# Patient Record
Sex: MALE | Race: WHITE | NOT HISPANIC OR LATINO | Employment: FULL TIME | URBAN - METROPOLITAN AREA
[De-identification: names, ages, dates, MRNs, and addresses within clinical notes are randomized per-mention and may not be internally consistent; named-entity substitution may affect disease eponyms.]

---

## 2020-03-19 ENCOUNTER — LAB (OUTPATIENT)
Dept: LAB | Facility: HOSPITAL | Age: 50
End: 2020-03-19

## 2020-03-19 ENCOUNTER — TRANSCRIBE ORDERS (OUTPATIENT)
Dept: ADMINISTRATIVE | Facility: HOSPITAL | Age: 50
End: 2020-03-19

## 2020-03-19 ENCOUNTER — HOSPITAL ENCOUNTER (OUTPATIENT)
Dept: CARDIOLOGY | Facility: HOSPITAL | Age: 50
Discharge: HOME | End: 2020-03-19
Admitting: UROLOGY

## 2020-03-19 DIAGNOSIS — N28.89 RENAL MASS: ICD-10-CM

## 2020-03-19 DIAGNOSIS — Z01.818 PREOP EXAMINATION: Primary | ICD-10-CM

## 2020-03-19 DIAGNOSIS — Z01.818 PREOP EXAMINATION: ICD-10-CM

## 2020-03-19 LAB
ABO GROUP BLD: NORMAL
BLD GP AB SCN SERPL QL: NEGATIVE
RH BLD: NEGATIVE
T&S EXPIRATION DATE: NORMAL

## 2020-03-19 PROCEDURE — 93005 ELECTROCARDIOGRAM TRACING: CPT | Performed by: UROLOGY

## 2020-03-19 PROCEDURE — 86900 BLOOD TYPING SEROLOGIC ABO: CPT

## 2020-03-19 PROCEDURE — 86850 RBC ANTIBODY SCREEN: CPT | Performed by: UROLOGY

## 2020-03-19 PROCEDURE — 85025 COMPLETE CBC W/AUTO DIFF WBC: CPT

## 2020-03-19 PROCEDURE — 86900 BLOOD TYPING SEROLOGIC ABO: CPT | Performed by: UROLOGY

## 2020-03-19 PROCEDURE — 36415 COLL VENOUS BLD VENIPUNCTURE: CPT

## 2020-03-19 PROCEDURE — 80048 BASIC METABOLIC PNL TOTAL CA: CPT

## 2020-03-19 PROCEDURE — 86901 BLOOD TYPING SEROLOGIC RH(D): CPT

## 2020-03-19 PROCEDURE — 86901 BLOOD TYPING SEROLOGIC RH(D): CPT | Performed by: UROLOGY

## 2020-03-20 LAB
ANION GAP SERPL CALCULATED.3IONS-SCNC: 7.6 MMOL/L (ref 5–15)
BASOPHILS # BLD AUTO: 0.04 10*3/MM3 (ref 0–0.2)
BASOPHILS NFR BLD AUTO: 0.6 % (ref 0–1.5)
BUN BLD-MCNC: 13 MG/DL (ref 6–20)
BUN/CREAT SERPL: 14.9 (ref 7–25)
CALCIUM SPEC-SCNC: 9.6 MG/DL (ref 8.6–10.5)
CHLORIDE SERPL-SCNC: 99 MMOL/L (ref 98–107)
CO2 SERPL-SCNC: 29.4 MMOL/L (ref 22–29)
CREAT BLD-MCNC: 0.87 MG/DL (ref 0.76–1.27)
DEPRECATED RDW RBC AUTO: 40.3 FL (ref 37–54)
EOSINOPHIL # BLD AUTO: 0.22 10*3/MM3 (ref 0–0.4)
EOSINOPHIL NFR BLD AUTO: 3 % (ref 0.3–6.2)
ERYTHROCYTE [DISTWIDTH] IN BLOOD BY AUTOMATED COUNT: 13.1 % (ref 12.3–15.4)
GFR SERPL CREATININE-BSD FRML MDRD: 93 ML/MIN/1.73
GLUCOSE BLD-MCNC: 108 MG/DL (ref 65–99)
HCT VFR BLD AUTO: 43.3 % (ref 37.5–51)
HGB BLD-MCNC: 14.4 G/DL (ref 13–17.7)
IMM GRANULOCYTES # BLD AUTO: 0.03 10*3/MM3 (ref 0–0.05)
IMM GRANULOCYTES NFR BLD AUTO: 0.4 % (ref 0–0.5)
LYMPHOCYTES # BLD AUTO: 1.71 10*3/MM3 (ref 0.7–3.1)
LYMPHOCYTES NFR BLD AUTO: 23.7 % (ref 19.6–45.3)
MCH RBC QN AUTO: 28.4 PG (ref 26.6–33)
MCHC RBC AUTO-ENTMCNC: 33.3 G/DL (ref 31.5–35.7)
MCV RBC AUTO: 85.4 FL (ref 79–97)
MONOCYTES # BLD AUTO: 0.56 10*3/MM3 (ref 0.1–0.9)
MONOCYTES NFR BLD AUTO: 7.8 % (ref 5–12)
NEUTROPHILS # BLD AUTO: 4.66 10*3/MM3 (ref 1.7–7)
NEUTROPHILS NFR BLD AUTO: 64.5 % (ref 42.7–76)
NRBC BLD AUTO-RTO: 0.1 /100 WBC (ref 0–0.2)
PLATELET # BLD AUTO: 222 10*3/MM3 (ref 140–450)
PMV BLD AUTO: 10.4 FL (ref 6–12)
POTASSIUM BLD-SCNC: 4.2 MMOL/L (ref 3.5–5.2)
RBC # BLD AUTO: 5.07 10*6/MM3 (ref 4.14–5.8)
SODIUM BLD-SCNC: 136 MMOL/L (ref 136–145)
WBC NRBC COR # BLD: 7.22 10*3/MM3 (ref 3.4–10.8)

## 2020-03-23 ENCOUNTER — LAB REQUISITION (OUTPATIENT)
Dept: LAB | Facility: HOSPITAL | Age: 50
End: 2020-03-23

## 2020-03-23 DIAGNOSIS — N28.89 OTHER SPECIFIED DISORDERS OF KIDNEY AND URETER: ICD-10-CM

## 2020-03-23 PROCEDURE — 88307 TISSUE EXAM BY PATHOLOGIST: CPT | Performed by: UROLOGY

## 2020-03-23 PROCEDURE — 88342 IMHCHEM/IMCYTCHM 1ST ANTB: CPT | Performed by: UROLOGY

## 2020-03-24 ENCOUNTER — LAB REQUISITION (OUTPATIENT)
Dept: LAB | Facility: HOSPITAL | Age: 50
End: 2020-03-24

## 2020-03-24 DIAGNOSIS — N28.89 OTHER SPECIFIED DISORDERS OF KIDNEY AND URETER: ICD-10-CM

## 2020-03-24 DIAGNOSIS — K21.9 GASTRO-ESOPHAGEAL REFLUX DISEASE WITHOUT ESOPHAGITIS: ICD-10-CM

## 2020-03-24 DIAGNOSIS — J44.9 CHRONIC OBSTRUCTIVE PULMONARY DISEASE, UNSPECIFIED (HCC): ICD-10-CM

## 2020-03-24 DIAGNOSIS — I10 ESSENTIAL (PRIMARY) HYPERTENSION: ICD-10-CM

## 2020-03-24 LAB
ANION GAP SERPL CALCULATED.3IONS-SCNC: 11 MMOL/L (ref 5–15)
ANION GAP SERPL CALCULATED.3IONS-SCNC: 13 MMOL/L (ref 5–15)
BASOPHILS # BLD AUTO: 0 10*3/MM3 (ref 0–0.2)
BASOPHILS NFR BLD AUTO: 0.1 % (ref 0–1.5)
BUN BLD-MCNC: 15 MG/DL (ref 6–20)
BUN BLD-MCNC: 16 MG/DL (ref 6–20)
BUN/CREAT SERPL: 10.9 (ref 7–25)
BUN/CREAT SERPL: 11.9 (ref 7–25)
CALCIUM SPEC-SCNC: 8.8 MG/DL (ref 8.6–10.5)
CALCIUM SPEC-SCNC: 9.1 MG/DL (ref 8.6–10.5)
CHLORIDE SERPL-SCNC: 94 MMOL/L (ref 98–107)
CHLORIDE SERPL-SCNC: 98 MMOL/L (ref 98–107)
CO2 SERPL-SCNC: 25 MMOL/L (ref 22–29)
CO2 SERPL-SCNC: 27 MMOL/L (ref 22–29)
CREAT BLD-MCNC: 1.34 MG/DL (ref 0.76–1.27)
CREAT BLD-MCNC: 1.37 MG/DL (ref 0.76–1.27)
DEPRECATED RDW RBC AUTO: 42 FL (ref 37–54)
EOSINOPHIL # BLD AUTO: 0 10*3/MM3 (ref 0–0.4)
EOSINOPHIL NFR BLD AUTO: 0.1 % (ref 0.3–6.2)
ERYTHROCYTE [DISTWIDTH] IN BLOOD BY AUTOMATED COUNT: 13.8 % (ref 12.3–15.4)
GFR SERPL CREATININE-BSD FRML MDRD: 55 ML/MIN/1.73
GFR SERPL CREATININE-BSD FRML MDRD: 56 ML/MIN/1.73
GLUCOSE BLD-MCNC: 133 MG/DL (ref 65–99)
GLUCOSE BLD-MCNC: 168 MG/DL (ref 65–99)
HCT VFR BLD AUTO: 42.8 % (ref 37.5–51)
HGB BLD-MCNC: 14.1 G/DL (ref 13–17.7)
LYMPHOCYTES # BLD AUTO: 1.1 10*3/MM3 (ref 0.7–3.1)
LYMPHOCYTES NFR BLD AUTO: 9.8 % (ref 19.6–45.3)
MCH RBC QN AUTO: 28.1 PG (ref 26.6–33)
MCHC RBC AUTO-ENTMCNC: 32.9 G/DL (ref 31.5–35.7)
MCV RBC AUTO: 85.4 FL (ref 79–97)
MONOCYTES # BLD AUTO: 0.9 10*3/MM3 (ref 0.1–0.9)
MONOCYTES NFR BLD AUTO: 8.3 % (ref 5–12)
NEUTROPHILS # BLD AUTO: 9.3 10*3/MM3 (ref 1.7–7)
NEUTROPHILS NFR BLD AUTO: 81.7 % (ref 42.7–76)
NRBC BLD AUTO-RTO: 0 /100 WBC (ref 0–0.2)
PLATELET # BLD AUTO: 228 10*3/MM3 (ref 140–450)
PMV BLD AUTO: 8 FL (ref 6–12)
POTASSIUM BLD-SCNC: 4.8 MMOL/L (ref 3.5–5.2)
POTASSIUM BLD-SCNC: 5.5 MMOL/L (ref 3.5–5.2)
RBC # BLD AUTO: 5.01 10*6/MM3 (ref 4.14–5.8)
SODIUM BLD-SCNC: 134 MMOL/L (ref 136–145)
SODIUM BLD-SCNC: 134 MMOL/L (ref 136–145)
WBC NRBC COR # BLD: 11.3 10*3/MM3 (ref 3.4–10.8)

## 2020-03-24 PROCEDURE — 80048 BASIC METABOLIC PNL TOTAL CA: CPT

## 2020-03-24 PROCEDURE — 80048 BASIC METABOLIC PNL TOTAL CA: CPT | Performed by: UROLOGY

## 2020-03-24 PROCEDURE — 85025 COMPLETE CBC W/AUTO DIFF WBC: CPT | Performed by: UROLOGY

## 2020-03-26 LAB
LAB AP CASE REPORT: NORMAL
LAB AP SYNOPTIC CHECKLIST: NORMAL
PATH REPORT.FINAL DX SPEC: NORMAL
PATH REPORT.GROSS SPEC: NORMAL

## 2020-04-09 PROCEDURE — 93010 ELECTROCARDIOGRAM REPORT: CPT | Performed by: INTERNAL MEDICINE

## 2020-08-05 ENCOUNTER — TRANSCRIBE ORDERS (OUTPATIENT)
Dept: ADMINISTRATIVE | Facility: HOSPITAL | Age: 50
End: 2020-08-05

## 2020-08-05 DIAGNOSIS — R06.02 SHORTNESS OF BREATH: Primary | ICD-10-CM

## 2020-08-28 ENCOUNTER — APPOINTMENT (OUTPATIENT)
Dept: CARDIOLOGY | Facility: HOSPITAL | Age: 50
End: 2020-08-28

## 2024-09-03 ENCOUNTER — OFFICE VISIT (OUTPATIENT)
Dept: FAMILY MEDICINE CLINIC | Facility: CLINIC | Age: 54
End: 2024-09-03
Payer: COMMERCIAL

## 2024-09-03 VITALS
BODY MASS INDEX: 42.66 KG/M2 | OXYGEN SATURATION: 96 % | WEIGHT: 315 LBS | RESPIRATION RATE: 18 BRPM | HEART RATE: 92 BPM | DIASTOLIC BLOOD PRESSURE: 86 MMHG | SYSTOLIC BLOOD PRESSURE: 129 MMHG | HEIGHT: 72 IN | TEMPERATURE: 98.5 F

## 2024-09-03 DIAGNOSIS — I10 ESSENTIAL HYPERTENSION: ICD-10-CM

## 2024-09-03 DIAGNOSIS — Z13.0 SCREENING FOR ENDOCRINE, METABOLIC AND IMMUNITY DISORDER: ICD-10-CM

## 2024-09-03 DIAGNOSIS — Z76.89 ENCOUNTER TO ESTABLISH CARE: ICD-10-CM

## 2024-09-03 DIAGNOSIS — M54.50 CHRONIC MIDLINE LOW BACK PAIN, UNSPECIFIED WHETHER SCIATICA PRESENT: ICD-10-CM

## 2024-09-03 DIAGNOSIS — E11.65 TYPE 2 DIABETES MELLITUS WITH HYPERGLYCEMIA, WITHOUT LONG-TERM CURRENT USE OF INSULIN: Primary | ICD-10-CM

## 2024-09-03 DIAGNOSIS — Z13.228 SCREENING FOR ENDOCRINE, METABOLIC AND IMMUNITY DISORDER: ICD-10-CM

## 2024-09-03 DIAGNOSIS — G89.29 CHRONIC PAIN OF LEFT KNEE: ICD-10-CM

## 2024-09-03 DIAGNOSIS — Z13.220 SCREENING FOR LIPID DISORDERS: ICD-10-CM

## 2024-09-03 DIAGNOSIS — M25.562 CHRONIC PAIN OF LEFT KNEE: ICD-10-CM

## 2024-09-03 DIAGNOSIS — M25.561 CHRONIC PAIN OF RIGHT KNEE: ICD-10-CM

## 2024-09-03 DIAGNOSIS — G89.29 CHRONIC PAIN OF RIGHT KNEE: ICD-10-CM

## 2024-09-03 DIAGNOSIS — Z13.29 SCREENING FOR THYROID DISORDER: ICD-10-CM

## 2024-09-03 DIAGNOSIS — R00.0 TACHYCARDIA: ICD-10-CM

## 2024-09-03 DIAGNOSIS — Z13.29 SCREENING FOR ENDOCRINE, METABOLIC AND IMMUNITY DISORDER: ICD-10-CM

## 2024-09-03 DIAGNOSIS — G89.29 CHRONIC MIDLINE LOW BACK PAIN, UNSPECIFIED WHETHER SCIATICA PRESENT: ICD-10-CM

## 2024-09-03 PROBLEM — K76.0 STEATOSIS OF LIVER: Status: ACTIVE | Noted: 2020-02-10

## 2024-09-03 PROBLEM — R91.1 SOLITARY PULMONARY NODULE: Status: ACTIVE | Noted: 2020-02-10

## 2024-09-03 PROBLEM — N28.89 RENAL MASS: Status: ACTIVE | Noted: 2020-02-10

## 2024-09-03 PROBLEM — S20.219A CONTUSION OF CHEST: Status: ACTIVE | Noted: 2020-01-27

## 2024-09-03 PROCEDURE — 99205 OFFICE O/P NEW HI 60 MIN: CPT | Performed by: REGISTERED NURSE

## 2024-09-03 RX ORDER — DAPAGLIFLOZIN AND METFORMIN HYDROCHLORIDE 10; 1000 MG/1; MG/1
1 TABLET, FILM COATED, EXTENDED RELEASE ORAL DAILY
Qty: 90 TABLET | Refills: 1 | Status: SHIPPED | OUTPATIENT
Start: 2024-09-03

## 2024-09-03 RX ORDER — ONDANSETRON 8 MG/1
8 TABLET, FILM COATED ORAL EVERY 8 HOURS PRN
COMMUNITY

## 2024-09-03 RX ORDER — METOPROLOL TARTRATE 100 MG
100 TABLET ORAL 2 TIMES DAILY
COMMUNITY
End: 2024-09-03 | Stop reason: DRUGHIGH

## 2024-09-03 RX ORDER — LOSARTAN POTASSIUM 50 MG/1
50 TABLET ORAL DAILY
COMMUNITY

## 2024-09-03 RX ORDER — DAPAGLIFLOZIN AND METFORMIN HYDROCHLORIDE 10; 1000 MG/1; MG/1
1 TABLET, FILM COATED, EXTENDED RELEASE ORAL DAILY
COMMUNITY
End: 2024-09-03

## 2024-09-03 RX ORDER — NAPROXEN 500 MG/1
TABLET ORAL
COMMUNITY

## 2024-09-03 RX ORDER — METOPROLOL TARTRATE 50 MG
50 TABLET ORAL 2 TIMES DAILY
COMMUNITY
End: 2024-09-03 | Stop reason: DRUGHIGH

## 2024-09-03 RX ORDER — METOPROLOL TARTRATE 50 MG
50 TABLET ORAL 2 TIMES DAILY
Qty: 180 TABLET | Refills: 1 | Status: SHIPPED | OUTPATIENT
Start: 2024-09-03

## 2024-09-03 RX ORDER — ORAL SEMAGLUTIDE 7 MG/1
7 TABLET ORAL DAILY
COMMUNITY
End: 2024-09-03

## 2024-09-03 RX ORDER — ATORVASTATIN CALCIUM 20 MG/1
20 TABLET, FILM COATED ORAL DAILY
COMMUNITY

## 2024-09-03 NOTE — PROGRESS NOTES
Chief Complaint  Establish Care (Patient is here to establish care today)    Subjective    History of Present Illness {CC  Problem List  Visit  Diagnosis   Encounters  Notes  Medications  Labs  Result Review Imaging  Media :23}     Arvin Patel presents to [facility] for Establish Care (Patient is here to establish care today).      History of Present Illness  Patient is a 53 y.o. male who presents to the clinic today for establishment of care with concerns or chronic type 2 diabetes, chronic hypertension with tachycardia, and chronic pain in bilateral knees and lumbar greater than 1 year.  Patient denies any chest pain, shortness of breath, or any fevers.  Patient denies any known exposure to COVID, flu, or any other contagious illnesses.    In regards to type 2 diabetes, patient shares that he is currently not taking any medication due to running out more than 2 months ago.  He shares that he previously was prescribed xigduo for his diabetes as well as Rybelsus.  He shares that in the past he had taken metformin by itself but most recently was taken Xigduo and Rybelsus.  He shares that they helped some but were not significantly improving his glucose levels.  Patient shares that Rybelsus made him extremely nauseated and he would like to discuss other options.  We discussed potentially starting Mounjaro today.  Patient would like to move forward with this option.    In regards to hypertension with tachycardia, patient is currently prescribed metoprolol.  He has not taken this for more than 2 months.  His blood pressure today is 129/86 and pulse rate is 92.  Patient does share that at home his pulse rate occasionally goes above 100.  He is okay with restarting this medication but would like to start at the lower dose of 50 mg twice daily.  Med has been sent to pharmacy at patient's request.    In regards to chronic pain in bilateral knees and lower back, patient shares  that he has been experiencing bilateral knee pain and lower back pain more than a year.  He shares that he had a very significant hard life with heavy work and is progressively had worsening knees and back.  Patient does report historical knee injuries but does not know of any recent specific injury.  Patient does take ibuprofen to help with the pain and discomfort.  Patient does try to exercise at home as well as use hot and cold therapy.  Patient shares that these are not working well for him with no significant relief.    Patient shares that he was started on cholesterol medication but have perfect cholesterol.  He shares that his provider told him that this was to help prevent cardiac disease.  Patient would rather check his cholesterol today and stay off of the cholesterol reducing medication if possible.       Review of Systems   Constitutional: Negative.  Negative for activity change, chills, fatigue and fever.   HENT: Negative.  Negative for congestion, dental problem, ear pain, hearing loss, rhinorrhea, sinus pain, sore throat, tinnitus and trouble swallowing.    Eyes: Negative.  Negative for pain and visual disturbance.   Respiratory: Negative.  Negative for cough, chest tightness, shortness of breath and wheezing.    Cardiovascular: Negative.  Negative for chest pain, palpitations and leg swelling.   Gastrointestinal: Negative.  Negative for abdominal pain, diarrhea, nausea and vomiting.   Endocrine: Negative.  Negative for polydipsia, polyphagia and polyuria.   Genitourinary: Negative.  Negative for difficulty urinating, dysuria, frequency and urgency.   Musculoskeletal:  Positive for arthralgias and back pain. Negative for myalgias.   Skin: Negative.  Negative for color change, pallor, rash and wound.   Allergic/Immunologic: Negative.  Negative for environmental allergies.   Neurological: Negative.  Negative for dizziness, speech difficulty, weakness, light-headedness, numbness and headaches.  "  Hematological: Negative.    Psychiatric/Behavioral: Negative.  Negative for confusion, decreased concentration, self-injury and suicidal ideas. The patient is not nervous/anxious.    All other systems reviewed and are negative.       Objective     Vital Signs:   /86 (BP Location: Left arm, Patient Position: Sitting, Cuff Size: Large Adult)   Pulse 92   Temp 98.5 °F (36.9 °C) (Oral)   Resp 18   Ht 182.9 cm (72\")   Wt (!) 153 kg (338 lb 3.2 oz)   SpO2 96%   BMI 45.87 kg/m²   Current Outpatient Medications on File Prior to Visit   Medication Sig Dispense Refill    atorvastatin (LIPITOR) 20 MG tablet Take 1 tablet by mouth Daily.      naproxen (NAPROSYN) 500 MG tablet naproxen 500 mg tablet      ondansetron (ZOFRAN) 8 MG tablet Take 1 tablet by mouth Every 8 (Eight) Hours As Needed for Nausea or Vomiting.      [DISCONTINUED] dapagliflozin-metformin HCl ER (XIGDUO XR)  MG tablet Take 1 tablet by mouth Daily.      [DISCONTINUED] metoprolol tartrate (LOPRESSOR) 100 MG tablet Take 1 tablet by mouth 2 (Two) Times a Day.      [DISCONTINUED] Semaglutide (Rybelsus) 7 MG tablet Take 7 mg by mouth Daily.      losartan (COZAAR) 50 MG tablet Take 1 tablet by mouth Daily. (Patient not taking: Reported on 9/3/2024)      multivitamin with minerals (CENTRUM SILVER 50+MEN PO) Centrum Silver (Patient not taking: Reported on 9/3/2024)      [DISCONTINUED] metoprolol tartrate (LOPRESSOR) 50 MG tablet Take 1 tablet by mouth 2 (Two) Times a Day. (Patient not taking: Reported on 9/3/2024)       No current facility-administered medications on file prior to visit.        Past Medical History:   Diagnosis Date    Allergic     Anxiety     Arthritis     Cancer     Depression     Diabetes mellitus     GERD (gastroesophageal reflux disease)     HL (hearing loss)     Hypertension     Low back pain     Neuromuscular disorder     Obesity     Visual impairment       Past Surgical History:   Procedure Laterality Date    ADENOIDECTOMY "      ANKLE SURGERY Left     placement of bar and screws    NEPHRECTOMY Left     TONSILLECTOMY      WRIST GANGLION EXCISION Left       Family History   Problem Relation Age of Onset    Migraine headaches Mother     Hypertension Father     Hyperlipidemia Father     Diabetes Father     Stroke Father     Dementia Father     Cancer Sister       Social History     Socioeconomic History    Marital status:    Tobacco Use    Smoking status: Former     Current packs/day: 1.00     Average packs/day: 1 pack/day for 40.7 years (40.7 ttl pk-yrs)     Types: Cigarettes     Start date: 1984    Smokeless tobacco: Never   Vaping Use    Vaping status: Never Used   Substance and Sexual Activity    Alcohol use: Not Currently    Drug use: Never    Sexual activity: Yes     Partners: Female         No visits with results within 3 Month(s) from this visit.   Latest known visit with results is:   Lab Requisition on 03/24/2020   Component Date Value Ref Range Status    Glucose 03/24/2020 133 (H)  65 - 99 mg/dL Final    BUN 03/24/2020 16  6 - 20 mg/dL Final    Creatinine 03/24/2020 1.34 (H)  0.76 - 1.27 mg/dL Final    Sodium 03/24/2020 134 (L)  136 - 145 mmol/L Final    Potassium 03/24/2020 4.8  3.5 - 5.2 mmol/L Final    Chloride 03/24/2020 98  98 - 107 mmol/L Final    CO2 03/24/2020 25.0  22.0 - 29.0 mmol/L Final    Calcium 03/24/2020 9.1  8.6 - 10.5 mg/dL Final    eGFR Non African Amer 03/24/2020 56 (L)  >60 mL/min/1.73 Final    BUN/Creatinine Ratio 03/24/2020 11.9  7.0 - 25.0 Final    Anion Gap 03/24/2020 11.0  5.0 - 15.0 mmol/L Final         Physical Exam  Vitals and nursing note reviewed.   Constitutional:       Appearance: Normal appearance. He is obese.   HENT:      Head: Normocephalic and atraumatic.   Cardiovascular:      Rate and Rhythm: Normal rate and regular rhythm.      Pulses: Normal pulses.      Heart sounds: Normal heart sounds. No murmur heard.     No friction rub. No gallop.   Pulmonary:      Effort: Pulmonary effort  is normal. No respiratory distress.      Breath sounds: Normal breath sounds. No stridor. No wheezing, rhonchi or rales.   Chest:      Chest wall: No tenderness.   Abdominal:      General: Abdomen is flat. Bowel sounds are normal. There is no distension.      Palpations: Abdomen is soft. There is no mass.      Tenderness: There is no abdominal tenderness. There is no right CVA tenderness, left CVA tenderness, guarding or rebound.      Hernia: No hernia is present.   Skin:     General: Skin is warm and dry.      Capillary Refill: Capillary refill takes less than 2 seconds.      Coloration: Skin is not jaundiced or pale.   Neurological:      General: No focal deficit present.      Mental Status: He is alert and oriented to person, place, and time. Mental status is at baseline.      Motor: No weakness.      Coordination: Coordination normal.      Gait: Gait normal.   Psychiatric:         Mood and Affect: Mood normal.         Behavior: Behavior normal.         Thought Content: Thought content normal.         Judgment: Judgment normal.          Result Review  Data Reviewed:{ Labs  Result Review  Imaging  Med Tab  Media :23}   I have reviewed this patient's chart.  I have reviewed previous labs, previous imaging, previous medications, and previous encounters with notes that were available in this patient's chart.               Assessment and Plan {CC Problem List  Visit Diagnosis  ROS  Review (Popup)  [facility] Maintenance  Quality  BestPractice  Medications  SmartSets  SnapShot Encounters  Media :23}   Diagnoses and all orders for this visit:    1. Type 2 diabetes mellitus with hyperglycemia, without long-term current use of insulin (Primary)  -     Xigduo XR  MG tablet; Take 1 tablet by mouth Daily.  Dispense: 90 tablet; Refill: 1  -     Hemoglobin A1c; Future  -     Microalbumin / Creatinine Urine Ratio - Urine, Clean Catch; Future  -     Tirzepatide (Mounjaro) 2.5 MG/0.5ML solution pen-injector  pen; Inject 0.5 mL under the skin into the appropriate area as directed 1 (One) Time Per Week.  Dispense: 2 mL; Refill: 0    2. Essential hypertension  -     metoprolol tartrate (LOPRESSOR) 50 MG tablet; Take 1 tablet by mouth 2 (Two) Times a Day.  Dispense: 180 tablet; Refill: 1  -     CBC & Differential; Future  -     Comprehensive Metabolic Panel; Future  -     Tirzepatide (Mounjaro) 2.5 MG/0.5ML solution pen-injector pen; Inject 0.5 mL under the skin into the appropriate area as directed 1 (One) Time Per Week.  Dispense: 2 mL; Refill: 0    3. Tachycardia  -     metoprolol tartrate (LOPRESSOR) 50 MG tablet; Take 1 tablet by mouth 2 (Two) Times a Day.  Dispense: 180 tablet; Refill: 1  -     Tirzepatide (Mounjaro) 2.5 MG/0.5ML solution pen-injector pen; Inject 0.5 mL under the skin into the appropriate area as directed 1 (One) Time Per Week.  Dispense: 2 mL; Refill: 0    4. Screening for endocrine, metabolic and immunity disorder  -     CBC & Differential; Future    5. Screening for lipid disorders  -     Lipid Panel; Future    6. Screening for thyroid disorder  -     TSH; Future    7. Encounter to establish care    8. Chronic pain of left knee  -     XR Knee 1 or 2 View Bilateral; Future  -     MRI Knee Left Without Contrast; Future    9. Chronic pain of right knee  -     XR Knee 1 or 2 View Bilateral; Future  -     MRI Knee Right Without Contrast; Future    10. Chronic midline low back pain, unspecified whether sciatica present  -     XR Spine Lumbar 2 or 3 View; Future  -     MRI Lumbar Spine Without Contrast; Future        -Imaging of bilateral knees have been ordered to rule out cause of worsening pain.  -Imaging of lower lumbar has been ordered to rule out cause of worsening pain.  -Fasting labs ordered for tomorrow or when next available.  -Restart metoprolol at lower dose for blood pressure and pulse rate control.  -Start Mounjaro to help with diabetes control.  -ER red flags discussed with patient  including risk versus benefit and education provided.  -Follow-up with me in 3 months or sooner if needed.    I spent 60 minutes caring for Arvin on this date of service. This time includes time spent by me in the following activities:preparing for the visit, reviewing tests, obtaining and/or reviewing a separately obtained history, performing a medically appropriate examination and/or evaluation , counseling and educating the patient/family/caregiver, ordering medications, tests, or procedures, referring and communicating with other health care professionals , documenting information in the medical record, independently interpreting results and communicating that information with the patient/family/caregiver, and care coordination.    Follow Up {Instructions Charge Capture  Follow-up Communications :23}     Patient was given instructions and counseling regarding his condition or for health maintenance advice. Please see specific information pulled into the AVS (placed there by myself) if appropriate.    Return in about 3 months (around 12/3/2024) for routine follow up.            LEIGH Sifuentes, FNP-BC

## 2024-09-06 ENCOUNTER — CLINICAL SUPPORT (OUTPATIENT)
Dept: FAMILY MEDICINE CLINIC | Facility: CLINIC | Age: 54
End: 2024-09-06
Payer: COMMERCIAL

## 2024-09-06 DIAGNOSIS — Z13.29 SCREENING FOR THYROID DISORDER: ICD-10-CM

## 2024-09-06 DIAGNOSIS — I10 ESSENTIAL HYPERTENSION: ICD-10-CM

## 2024-09-06 DIAGNOSIS — E11.65 TYPE 2 DIABETES MELLITUS WITH HYPERGLYCEMIA, WITHOUT LONG-TERM CURRENT USE OF INSULIN: ICD-10-CM

## 2024-09-06 DIAGNOSIS — Z13.0 SCREENING FOR ENDOCRINE, METABOLIC AND IMMUNITY DISORDER: ICD-10-CM

## 2024-09-06 DIAGNOSIS — Z13.220 SCREENING FOR LIPID DISORDERS: ICD-10-CM

## 2024-09-06 DIAGNOSIS — Z13.228 SCREENING FOR ENDOCRINE, METABOLIC AND IMMUNITY DISORDER: ICD-10-CM

## 2024-09-06 DIAGNOSIS — Z13.29 SCREENING FOR ENDOCRINE, METABOLIC AND IMMUNITY DISORDER: ICD-10-CM

## 2024-09-06 LAB
BASOPHILS # BLD AUTO: 0.08 10*3/MM3 (ref 0–0.2)
BASOPHILS NFR BLD AUTO: 1 % (ref 0–1.5)
DEPRECATED RDW RBC AUTO: 39.3 FL (ref 37–54)
EOSINOPHIL # BLD AUTO: 0.24 10*3/MM3 (ref 0–0.4)
EOSINOPHIL NFR BLD AUTO: 3 % (ref 0.3–6.2)
ERYTHROCYTE [DISTWIDTH] IN BLOOD BY AUTOMATED COUNT: 12.7 % (ref 12.3–15.4)
HCT VFR BLD AUTO: 43 % (ref 37.5–51)
HGB BLD-MCNC: 14.2 G/DL (ref 13–17.7)
IMM GRANULOCYTES # BLD AUTO: 0.07 10*3/MM3 (ref 0–0.05)
IMM GRANULOCYTES NFR BLD AUTO: 0.9 % (ref 0–0.5)
LYMPHOCYTES # BLD AUTO: 2 10*3/MM3 (ref 0.7–3.1)
LYMPHOCYTES NFR BLD AUTO: 24.7 % (ref 19.6–45.3)
MCH RBC QN AUTO: 28.6 PG (ref 26.6–33)
MCHC RBC AUTO-ENTMCNC: 33 G/DL (ref 31.5–35.7)
MCV RBC AUTO: 86.5 FL (ref 79–97)
MONOCYTES # BLD AUTO: 0.67 10*3/MM3 (ref 0.1–0.9)
MONOCYTES NFR BLD AUTO: 8.3 % (ref 5–12)
NEUTROPHILS NFR BLD AUTO: 5.04 10*3/MM3 (ref 1.7–7)
NEUTROPHILS NFR BLD AUTO: 62.1 % (ref 42.7–76)
NRBC BLD AUTO-RTO: 0 /100 WBC (ref 0–0.2)
PLATELET # BLD AUTO: 230 10*3/MM3 (ref 140–450)
PMV BLD AUTO: 11.1 FL (ref 6–12)
RBC # BLD AUTO: 4.97 10*6/MM3 (ref 4.14–5.8)
WBC NRBC COR # BLD AUTO: 8.1 10*3/MM3 (ref 3.4–10.8)

## 2024-09-06 PROCEDURE — 83036 HEMOGLOBIN GLYCOSYLATED A1C: CPT | Performed by: REGISTERED NURSE

## 2024-09-06 PROCEDURE — 80050 GENERAL HEALTH PANEL: CPT | Performed by: REGISTERED NURSE

## 2024-09-06 PROCEDURE — 82043 UR ALBUMIN QUANTITATIVE: CPT | Performed by: REGISTERED NURSE

## 2024-09-06 PROCEDURE — 36415 COLL VENOUS BLD VENIPUNCTURE: CPT | Performed by: REGISTERED NURSE

## 2024-09-06 PROCEDURE — 80061 LIPID PANEL: CPT | Performed by: REGISTERED NURSE

## 2024-09-06 PROCEDURE — 82570 ASSAY OF URINE CREATININE: CPT | Performed by: REGISTERED NURSE

## 2024-09-06 NOTE — PROGRESS NOTES
Venipuncture Blood Specimen Collection  Venipuncture performed in RT ARM VIA VENIPUNCTURE by Javi Smith with good hemostasis. Patient tolerated the procedure well without complications.   09/06/24   Javi Smith

## 2024-09-07 LAB
ALBUMIN SERPL-MCNC: 3.8 G/DL (ref 3.5–5.2)
ALBUMIN UR-MCNC: 3.8 MG/DL
ALBUMIN/GLOB SERPL: 1.3 G/DL
ALP SERPL-CCNC: 118 U/L (ref 39–117)
ALT SERPL W P-5'-P-CCNC: 23 U/L (ref 1–41)
ANION GAP SERPL CALCULATED.3IONS-SCNC: 10 MMOL/L (ref 5–15)
AST SERPL-CCNC: 24 U/L (ref 1–40)
BILIRUB SERPL-MCNC: 0.4 MG/DL (ref 0–1.2)
BUN SERPL-MCNC: 16 MG/DL (ref 6–20)
BUN/CREAT SERPL: 15.1 (ref 7–25)
CALCIUM SPEC-SCNC: 9.6 MG/DL (ref 8.6–10.5)
CHLORIDE SERPL-SCNC: 102 MMOL/L (ref 98–107)
CHOLEST SERPL-MCNC: 126 MG/DL (ref 0–200)
CO2 SERPL-SCNC: 25 MMOL/L (ref 22–29)
CREAT SERPL-MCNC: 1.06 MG/DL (ref 0.76–1.27)
CREAT UR-MCNC: 116.1 MG/DL
EGFRCR SERPLBLD CKD-EPI 2021: 82.9 ML/MIN/1.73
GLOBULIN UR ELPH-MCNC: 3 GM/DL
GLUCOSE SERPL-MCNC: 124 MG/DL (ref 65–99)
HBA1C MFR BLD: 8.8 % (ref 4.8–5.6)
HDLC SERPL-MCNC: 45 MG/DL (ref 40–60)
LDLC SERPL CALC-MCNC: 54 MG/DL (ref 0–100)
LDLC/HDLC SERPL: 1.1 {RATIO}
MICROALBUMIN/CREAT UR: 32.7 MG/G (ref 0–29)
POTASSIUM SERPL-SCNC: 4.6 MMOL/L (ref 3.5–5.2)
PROT SERPL-MCNC: 6.8 G/DL (ref 6–8.5)
SODIUM SERPL-SCNC: 137 MMOL/L (ref 136–145)
TRIGL SERPL-MCNC: 158 MG/DL (ref 0–150)
TSH SERPL DL<=0.05 MIU/L-ACNC: 3.21 UIU/ML (ref 0.27–4.2)
VLDLC SERPL-MCNC: 27 MG/DL (ref 5–40)

## 2024-09-24 ENCOUNTER — TELEPHONE (OUTPATIENT)
Dept: FAMILY MEDICINE CLINIC | Facility: CLINIC | Age: 54
End: 2024-09-24
Payer: COMMERCIAL

## 2024-09-30 ENCOUNTER — HOSPITAL ENCOUNTER (OUTPATIENT)
Dept: MRI IMAGING | Facility: HOSPITAL | Age: 54
Discharge: HOME | End: 2024-09-30
Admitting: REGISTERED NURSE
Payer: COMMERCIAL

## 2024-09-30 ENCOUNTER — APPOINTMENT (OUTPATIENT)
Dept: MRI IMAGING | Facility: HOSPITAL | Age: 54
End: 2024-09-30
Payer: COMMERCIAL

## 2024-09-30 DIAGNOSIS — M54.50 CHRONIC MIDLINE LOW BACK PAIN, UNSPECIFIED WHETHER SCIATICA PRESENT: ICD-10-CM

## 2024-09-30 DIAGNOSIS — G89.29 CHRONIC MIDLINE LOW BACK PAIN, UNSPECIFIED WHETHER SCIATICA PRESENT: ICD-10-CM

## 2024-09-30 PROCEDURE — 72148 MRI LUMBAR SPINE W/O DYE: CPT

## 2024-10-10 ENCOUNTER — TELEPHONE (OUTPATIENT)
Dept: FAMILY MEDICINE CLINIC | Facility: CLINIC | Age: 54
End: 2024-10-10

## 2024-10-10 DIAGNOSIS — I10 ESSENTIAL HYPERTENSION: ICD-10-CM

## 2024-10-10 DIAGNOSIS — R00.0 TACHYCARDIA: ICD-10-CM

## 2024-10-10 RX ORDER — LOSARTAN POTASSIUM 50 MG/1
50 TABLET ORAL DAILY
Qty: 90 TABLET | Refills: 0 | Status: SHIPPED | OUTPATIENT
Start: 2024-10-10

## 2024-10-10 RX ORDER — METOPROLOL TARTRATE 50 MG
50 TABLET ORAL 2 TIMES DAILY
Qty: 180 TABLET | Refills: 1 | Status: SHIPPED | OUTPATIENT
Start: 2024-10-10

## 2024-10-10 NOTE — TELEPHONE ENCOUNTER
Caller: AVNI    Relationship: Other    Best call back number: [phone]    AVNI CALLED WITH [employer], TO INFORM US THAT PATIENT'S MRI OF THE KNEE WAS DENIED BY INSURANCE AND THEY ARE REQUESTING WE CONTACT THE COMPANY, [employer]  FOR A PEER TO PEER     CONTACT NUMBER FOR [employer]: [phone]

## 2024-10-14 DIAGNOSIS — M48.061 SPINAL STENOSIS OF LUMBAR REGION, UNSPECIFIED WHETHER NEUROGENIC CLAUDICATION PRESENT: ICD-10-CM

## 2024-10-14 DIAGNOSIS — M51.369 BULGING LUMBAR DISC: Primary | ICD-10-CM

## 2024-10-15 ENCOUNTER — TELEPHONE (OUTPATIENT)
Dept: FAMILY MEDICINE CLINIC | Facility: CLINIC | Age: 54
End: 2024-10-15
Payer: COMMERCIAL

## 2024-10-15 NOTE — TELEPHONE ENCOUNTER
HUB IS INSTRUCTED TO RELAY BELOW MESSAGE     IF PT CALLS BACK WE DONT DO THAT HERE AT THE OFFICE HE WILL HAVE TO GO TO [facility] TO GET THAT. THANK YOU

## 2024-10-15 NOTE — TELEPHONE ENCOUNTER
Caller: Arvin Patel    Relationship: Self    Best call back number: [phone]     What was the call regarding: PATIENT WAS REFERRED TO AN ORTHOPEDIC SURGEON AND THEY TOLD HIM TO CALL HIS PROVIDER AND GET A COPY OF HIS MRI ON DISC. PLEASE CALL AND VERIFY HOW TO FACILITATE THAT.

## 2024-10-23 ENCOUNTER — TELEPHONE (OUTPATIENT)
Dept: FAMILY MEDICINE CLINIC | Facility: CLINIC | Age: 54
End: 2024-10-23

## 2024-10-23 NOTE — TELEPHONE ENCOUNTER
Caller: ZULEMA WALL    Relationship: Emergency Contact    Best call back number: [phone]     What medication are you requesting: ORAL YEAST MEDICATION     What are your current symptoms: YEAST/FORESKIN     How long have you been experiencing symptoms:2 DAYS AGO    Have you had these symptoms before:    [x] Yes  [] No    Have you been treated for these symptoms before:   [x] Yes  [] No    If a prescription is needed, what is your preferred pharmacy and phone number:    [facility] - [place], [place] - [address] [place] [address] [phone] [facility] [phone]  [phone]       Additional notes: PLEASE CALL AND ADVISE  WHEN DONE

## 2024-10-24 ENCOUNTER — OFFICE VISIT (OUTPATIENT)
Dept: FAMILY MEDICINE CLINIC | Facility: CLINIC | Age: 54
End: 2024-10-24
Payer: COMMERCIAL

## 2024-10-24 VITALS
HEIGHT: 72 IN | OXYGEN SATURATION: 95 % | DIASTOLIC BLOOD PRESSURE: 76 MMHG | WEIGHT: 315 LBS | RESPIRATION RATE: 14 BRPM | SYSTOLIC BLOOD PRESSURE: 125 MMHG | BODY MASS INDEX: 42.66 KG/M2 | HEART RATE: 86 BPM

## 2024-10-24 DIAGNOSIS — N48.1 BALANITIS: Primary | ICD-10-CM

## 2024-10-24 PROCEDURE — 99213 OFFICE O/P EST LOW 20 MIN: CPT

## 2024-10-24 RX ORDER — CLOTRIMAZOLE 1 %
1 CREAM (GRAM) TOPICAL 2 TIMES DAILY
Qty: 28 G | Refills: 0 | Status: SHIPPED | OUTPATIENT
Start: 2024-10-24 | End: 2024-11-07

## 2024-10-24 RX ORDER — FLUCONAZOLE 200 MG/1
200 TABLET ORAL DAILY
Qty: 7 TABLET | Refills: 0 | Status: SHIPPED | OUTPATIENT
Start: 2024-10-24 | End: 2024-10-31

## 2024-10-24 NOTE — PROGRESS NOTES
"Chief Complaint  Rash    Subjective    History of Present Illness {CC  Problem List  Visit  Diagnosis   Encounters  Notes  Medications  Labs  Result Review Imaging  Media :23}     Arvin Patel presents to [facility] for Rash.      History of Present Illness     53 YOM presents with chief complaint of penile rash. Patient says the penile rash started 3-4 days ago. He also reports significant penile discharge and swelling. He says the head of the penis is slightly swollen and erythematous since yesterday. He has some difficulty with foreskin retraction because of this. He denies paraphimosis occurring. He denies severe pain associated. He says he has white, foul smelling discharge from the urethra he has just cleaned off prior to this visit. He denies any fever, chills, dysuria, hematuria, nausea, vomiting, or abdominal pain. He has no concern for STDs. He has never had a rash or discharge like this in the past. He did recently start a SGLT2 inhibitor several weeks ago which may be contributing.     Objective     Vital Signs:   /76 (BP Location: Right arm, Patient Position: Sitting, Cuff Size: Large Adult)   Pulse 86   Resp 14   Ht 182.9 cm (72\")   Wt (!) 155 kg (341 lb)   SpO2 95%   BMI 46.25 kg/m²   Current Outpatient Medications on File Prior to Visit   Medication Sig Dispense Refill    atorvastatin (LIPITOR) 20 MG tablet Take 1 tablet by mouth Daily.      Farxiga 10 MG tablet Take 10 mg by mouth Daily. 90 tablet 1    losartan (COZAAR) 50 MG tablet Take 1 tablet by mouth Daily. 90 tablet 0    metoprolol tartrate (LOPRESSOR) 50 MG tablet Take 1 tablet by mouth 2 (Two) Times a Day. 180 tablet 1    multivitamin with minerals (CENTRUM SILVER 50+MEN PO)       naproxen (NAPROSYN) 500 MG tablet naproxen 500 mg tablet      ondansetron (ZOFRAN) 8 MG tablet Take 1 tablet by mouth Every 8 (Eight) Hours As Needed for Nausea or Vomiting.      Tirzepatide (Mounjaro) 2.5 " MG/0.5ML solution pen-injector pen Inject 0.5 mL under the skin into the appropriate area as directed 1 (One) Time Per Week. 2 mL 0     No current facility-administered medications on file prior to visit.        Past Medical History:   Diagnosis Date    Allergic     Anxiety     Arthritis     Cancer     Depression     Diabetes mellitus     GERD (gastroesophageal reflux disease)     HL (hearing loss)     Hypertension     Low back pain     Neuromuscular disorder     Obesity     Visual impairment       Past Surgical History:   Procedure Laterality Date    ADENOIDECTOMY      ANKLE SURGERY Left     placement of bar and screws    NEPHRECTOMY Left     TONSILLECTOMY      WRIST GANGLION EXCISION Left       Family History   Problem Relation Age of Onset    Migraine headaches Mother     Hypertension Father     Hyperlipidemia Father     Diabetes Father     Stroke Father     Dementia Father     Cancer Sister       Social History     Socioeconomic History    Marital status:    Tobacco Use    Smoking status: Former     Current packs/day: 1.00     Average packs/day: 1 pack/day for 40.8 years (40.8 ttl pk-yrs)     Types: Cigarettes     Start date: 1984    Smokeless tobacco: Never   Vaping Use    Vaping status: Never Used   Substance and Sexual Activity    Alcohol use: Not Currently    Drug use: Never    Sexual activity: Yes     Partners: Female         Clinical Support on 09/06/2024   Component Date Value Ref Range Status    Glucose 09/06/2024 124 (H)  65 - 99 mg/dL Final    BUN 09/06/2024 16  6 - 20 mg/dL Final    Creatinine 09/06/2024 1.06  0.76 - 1.27 mg/dL Final    Sodium 09/06/2024 137  136 - 145 mmol/L Final    Potassium 09/06/2024 4.6  3.5 - 5.2 mmol/L Final    Chloride 09/06/2024 102  98 - 107 mmol/L Final    CO2 09/06/2024 25.0  22.0 - 29.0 mmol/L Final    Calcium 09/06/2024 9.6  8.6 - 10.5 mg/dL Final    Total Protein 09/06/2024 6.8  6.0 - 8.5 g/dL Final    Albumin 09/06/2024 3.8  3.5 - 5.2 g/dL Final    ALT (SGPT)  09/06/2024 23  1 - 41 U/L Final    AST (SGOT) 09/06/2024 24  1 - 40 U/L Final    Alkaline Phosphatase 09/06/2024 118 (H)  39 - 117 U/L Final    Total Bilirubin 09/06/2024 0.4  0.0 - 1.2 mg/dL Final    Globulin 09/06/2024 3.0  gm/dL Final    A/G Ratio 09/06/2024 1.3  g/dL Final    BUN/Creatinine Ratio 09/06/2024 15.1  7.0 - 25.0 Final    Anion Gap 09/06/2024 10.0  5.0 - 15.0 mmol/L Final    eGFR 09/06/2024 82.9  >60.0 mL/min/1.73 Final    Total Cholesterol 09/06/2024 126  0 - 200 mg/dL Final    Triglycerides 09/06/2024 158 (H)  0 - 150 mg/dL Final    HDL Cholesterol 09/06/2024 45  40 - 60 mg/dL Final    LDL Cholesterol  09/06/2024 54  0 - 100 mg/dL Final    VLDL Cholesterol 09/06/2024 27  5 - 40 mg/dL Final    LDL/HDL Ratio 09/06/2024 1.10   Final    Hemoglobin A1C 09/06/2024 8.80 (H)  4.80 - 5.60 % Final    TSH 09/06/2024 3.210  0.270 - 4.200 uIU/mL Final    Microalbumin/Creatinine Ratio 09/06/2024 32.7 (H)  0.0 - 29.0 mg/g Final    Creatinine, Urine 09/06/2024 116.1  mg/dL Final    Microalbumin, Urine 09/06/2024 3.8  mg/dL Final    WBC 09/06/2024 8.10  3.40 - 10.80 10*3/mm3 Final    RBC 09/06/2024 4.97  4.14 - 5.80 10*6/mm3 Final    Hemoglobin 09/06/2024 14.2  13.0 - 17.7 g/dL Final    Hematocrit 09/06/2024 43.0  37.5 - 51.0 % Final    MCV 09/06/2024 86.5  79.0 - 97.0 fL Final    MCH 09/06/2024 28.6  26.6 - 33.0 pg Final    MCHC 09/06/2024 33.0  31.5 - 35.7 g/dL Final    RDW 09/06/2024 12.7  12.3 - 15.4 % Final    RDW-SD 09/06/2024 39.3  37.0 - 54.0 fl Final    MPV 09/06/2024 11.1  6.0 - 12.0 fL Final    Platelets 09/06/2024 230  140 - 450 10*3/mm3 Final    Neutrophil % 09/06/2024 62.1  42.7 - 76.0 % Final    Lymphocyte % 09/06/2024 24.7  19.6 - 45.3 % Final    Monocyte % 09/06/2024 8.3  5.0 - 12.0 % Final    Eosinophil % 09/06/2024 3.0  0.3 - 6.2 % Final    Basophil % 09/06/2024 1.0  0.0 - 1.5 % Final    Immature Grans % 09/06/2024 0.9 (H)  0.0 - 0.5 % Final    Neutrophils, Absolute 09/06/2024 5.04  1.70 - 7.00  10*3/mm3 Final    Lymphocytes, Absolute 09/06/2024 2.00  0.70 - 3.10 10*3/mm3 Final    Monocytes, Absolute 09/06/2024 0.67  0.10 - 0.90 10*3/mm3 Final    Eosinophils, Absolute 09/06/2024 0.24  0.00 - 0.40 10*3/mm3 Final    Basophils, Absolute 09/06/2024 0.08  0.00 - 0.20 10*3/mm3 Final    Immature Grans, Absolute 09/06/2024 0.07 (H)  0.00 - 0.05 10*3/mm3 Final    nRBC 09/06/2024 0.0  0.0 - 0.2 /100 WBC Final         Physical Exam  Exam conducted with a chaperone present.   Constitutional:       Appearance: Normal appearance.   HENT:      Head: Normocephalic and atraumatic.   Eyes:      General: No scleral icterus.     Extraocular Movements: Extraocular movements intact.   Cardiovascular:      Rate and Rhythm: Normal rate and regular rhythm.      Pulses: Normal pulses.      Heart sounds: Normal heart sounds. No murmur heard.     No friction rub. No gallop.   Pulmonary:      Effort: Pulmonary effort is normal.      Breath sounds: Normal breath sounds. No wheezing, rhonchi or rales.   Abdominal:      General: Abdomen is flat. Bowel sounds are normal.      Palpations: Abdomen is soft.      Tenderness: There is no abdominal tenderness. There is no right CVA tenderness or left CVA tenderness.   Genitourinary:     Comments: Non circumcised penis. Retraction of foreskin shows mild swelling and erythema of the glans penis, there is no significant discharge present though patient notes he just cleaned a lot of white discharge from the area prior to evaluation, there is erythema of the foreskin directly surrounding the glans. Foreskin not fully retracted due to swelling and concern for paraphimosis which has not occurred at this time.   Musculoskeletal:      Cervical back: Neck supple.   Skin:     General: Skin is warm and dry.   Neurological:      Mental Status: He is alert. Mental status is at baseline.      Coordination: Coordination normal.      Gait: Gait normal.   Psychiatric:         Mood and Affect: Mood normal.          Behavior: Behavior normal.         Thought Content: Thought content normal.         Judgment: Judgment normal.          Result Review  Data Reviewed:{ Labs  Result Review  Imaging  Med Tab  Media :23}   I have reviewed this patient's chart.  I have reviewed previous labs, previous imaging, previous medications, and previous encounters with notes that were available in this patient's chart.               Assessment and Plan {CC Problem List  Visit Diagnosis  ROS  Review (Popup)  Health Maintenance  Quality  BestPractice  Medications  SmartSets  SnapShot Encounters  Media :23}   Diagnoses and all orders for this visit:    1. Balanitis (Primary)  -     clotrimazole (LOTRIMIN) 1 % cream; Apply 1 Application topically to the appropriate area as directed 2 (Two) Times a Day for 14 days.  Dispense: 28 g; Refill: 0  -     fluconazole (Diflucan) 200 MG tablet; Take 1 tablet by mouth Daily for 7 days.  Dispense: 7 tablet; Refill: 0        -Will treat with clotrimazole 1% cream 2 times daily for 7 to 14 days.  Will also do Diflucan 200 mg once daily for a week.  Discussed appropriate hygiene to support healing.  Patient will go to the emergency department if there is any increase in swelling or significant pain development or paraphimosis.  Recommended follow-up here in 1 week if not improving.  -Discussed that taking an SGLT2 inhibitor may have led to this because of mechanism of action.  Discussed that he may need to be on a different medication for diabetes if this continues to occur.  -ER red flags discussed with patient including risk versus benefit and education provided.    Follow Up {Instructions Charge Capture  Follow-up Communications :23}     Patient was given instructions and counseling regarding his condition or for health maintenance advice. Please see specific information pulled into the AVS (placed there by myself) if appropriate.    Return in about 1 week (around 10/31/2024) for if not  improving .      Esperanza Do PA-C

## 2024-11-11 ENCOUNTER — OFFICE VISIT (OUTPATIENT)
Dept: FAMILY MEDICINE CLINIC | Facility: CLINIC | Age: 54
End: 2024-11-11
Payer: COMMERCIAL

## 2024-11-11 VITALS
OXYGEN SATURATION: 95 % | RESPIRATION RATE: 18 BRPM | HEART RATE: 87 BPM | HEIGHT: 72 IN | TEMPERATURE: 97.8 F | BODY MASS INDEX: 42.66 KG/M2 | DIASTOLIC BLOOD PRESSURE: 93 MMHG | SYSTOLIC BLOOD PRESSURE: 114 MMHG | WEIGHT: 315 LBS

## 2024-11-11 DIAGNOSIS — M25.562 BILATERAL CHRONIC KNEE PAIN: ICD-10-CM

## 2024-11-11 DIAGNOSIS — G89.29 BILATERAL CHRONIC KNEE PAIN: ICD-10-CM

## 2024-11-11 DIAGNOSIS — G89.29 CHRONIC MIDLINE LOW BACK PAIN, UNSPECIFIED WHETHER SCIATICA PRESENT: ICD-10-CM

## 2024-11-11 DIAGNOSIS — Z12.11 SCREENING FOR MALIGNANT NEOPLASM OF COLON: ICD-10-CM

## 2024-11-11 DIAGNOSIS — M25.561 BILATERAL CHRONIC KNEE PAIN: ICD-10-CM

## 2024-11-11 DIAGNOSIS — Z23 NEED FOR PROPHYLACTIC VACCINATION AND INOCULATION AGAINST INFLUENZA: ICD-10-CM

## 2024-11-11 DIAGNOSIS — E11.9 DIABETES MELLITUS TYPE 2 WITHOUT RETINOPATHY: Primary | ICD-10-CM

## 2024-11-11 DIAGNOSIS — M62.830 MUSCLE SPASM OF BACK: ICD-10-CM

## 2024-11-11 DIAGNOSIS — Z23 NEED FOR VACCINATION WITH 20-POLYVALENT PNEUMOCOCCAL CONJUGATE VACCINE: ICD-10-CM

## 2024-11-11 DIAGNOSIS — M54.50 CHRONIC MIDLINE LOW BACK PAIN, UNSPECIFIED WHETHER SCIATICA PRESENT: ICD-10-CM

## 2024-11-11 PROBLEM — H02.055 TRICHIASIS OF LEFT LOWER EYELID: Status: ACTIVE | Noted: 2020-07-13

## 2024-11-11 PROCEDURE — 99214 OFFICE O/P EST MOD 30 MIN: CPT | Performed by: REGISTERED NURSE

## 2024-11-11 PROCEDURE — 90656 IIV3 VACC NO PRSV 0.5 ML IM: CPT | Performed by: REGISTERED NURSE

## 2024-11-11 PROCEDURE — 90471 IMMUNIZATION ADMIN: CPT | Performed by: REGISTERED NURSE

## 2024-11-11 PROCEDURE — 90677 PCV20 VACCINE IM: CPT | Performed by: REGISTERED NURSE

## 2024-11-11 RX ORDER — ASPIRIN 81 MG
TABLET,CHEWABLE ORAL
COMMUNITY
Start: 2024-10-19

## 2024-11-11 RX ORDER — MELOXICAM 7.5 MG/1
7.5 TABLET ORAL DAILY PRN
Qty: 30 TABLET | Refills: 1 | Status: SHIPPED | OUTPATIENT
Start: 2024-11-11

## 2024-11-11 NOTE — PROGRESS NOTES
Chief Complaint  Follow-up (Needs to go over MRI results on back), Diabetes, Hypertension, and Hyperlipidemia    Subjective    History of Present Illness {CC  Problem List  Visit  Diagnosis   Encounters  Notes  Medications  Labs  Result Review Imaging  Media :23}     Arvin Patel presents to [facility] for Follow-up (Needs to go over MRI results on back), Diabetes, Hypertension, and Hyperlipidemia.      History of Present Illness  Patient is a 54 y.o. male who presents to the clinic today for 2-month follow-up for type 2 diabetes and chronic back and knee pain.  Patient denies any chest pain, shortness of breath, or any fevers.  Patient denies any known exposure to COVID, flu, or any other contagious illnesses.    Diabetes  He has type 2 diabetes mellitus. No MedicAlert identification noted. The initial diagnosis of diabetes was made 4 years ago. Hypoglycemia symptoms include confusion, headaches, sweats and tremors. Pertinent negatives for hypoglycemia include no dizziness, nervousness/anxiousness, pallor or speech difficulty. Associated symptoms include foot paresthesias, polydipsia and polyuria. Pertinent negatives for diabetes include no blurred vision, no chest pain, no fatigue, no foot ulcerations, no polyphagia, no weakness and no weight loss. Hypoglycemia complications include required assistance and required glucagon injection. Pertinent negatives for hypoglycemia complications include no blackouts, no hospitalization and no nocturnal hypoglycemia. Symptoms are stable. He is compliant with treatment all of the time. He is following a generally unhealthy diet. When asked about meal planning, he reported none. He participates in exercise three times a week. He monitors blood glucose at home 1-2 x per day. His highest blood glucose is >200 mg/dl. He does not see a podiatrist. Eye exam is current.        History of Present Illness  In regards to type 2 diabetes, he  has not yet collected his Mounjaro medication due to uncertainty about insurance coverage.  He will try to fill this at the pharmacy today.  He is considering Januvia as an alternative treatment option if Mounjaro is not able to be filled.    He underwent an MRI scan of his back, which revealed several issues. He consulted with an orthopedic specialist who referred him to a pain clinic. However, he was unable to secure an appointment until 11/21/2024, which would only be a consultation. His back pain has caused him to miss work and has worsened over time. His job is physically demanding. He has previously taken muscle relaxants but not recently. He has a single kidney and is concerned about the potential impact of NSAIDs on his kidney function.    He was also supposed to have an MRI of his knees, but his insurance denied coverage. He has received injections in his knees in the past he shares.    SOCIAL HISTORY  The patient has quit drinking alcohol.       Review of Systems   Constitutional: Negative.  Negative for activity change, chills, fatigue, fever and weight loss.   HENT:  Negative for congestion, dental problem, ear pain, hearing loss, rhinorrhea, sinus pain, sore throat, tinnitus and trouble swallowing.    Eyes: Negative.  Negative for blurred vision, pain and visual disturbance.   Respiratory: Negative.  Negative for cough, chest tightness, shortness of breath and wheezing.    Cardiovascular: Negative.  Negative for chest pain, palpitations and leg swelling.   Gastrointestinal: Negative.  Negative for abdominal pain, diarrhea, nausea and vomiting.   Endocrine: Positive for polydipsia and polyuria. Negative for polyphagia.   Genitourinary: Negative.  Negative for difficulty urinating, dysuria, frequency and urgency.   Musculoskeletal:  Positive for back pain. Negative for arthralgias and myalgias.   Skin: Negative.  Negative for color change, pallor, rash and wound.   Allergic/Immunologic: Negative.   "Negative for environmental allergies.   Neurological:  Positive for tremors and headaches. Negative for dizziness, speech difficulty, weakness, light-headedness and numbness.   Hematological: Negative.    Psychiatric/Behavioral:  Positive for confusion. Negative for decreased concentration, self-injury and suicidal ideas. The patient is not nervous/anxious.    All other systems reviewed and are negative.       Objective     Vital Signs:   /93 (BP Location: Left arm, Patient Position: Sitting, Cuff Size: Large Adult)   Pulse 87   Temp 97.8 °F (36.6 °C) (Temporal)   Resp 18   Ht 182.9 cm (72\")   Wt (!) 155 kg (341 lb 6.4 oz)   SpO2 95%   BMI 46.30 kg/m²   Current Outpatient Medications on File Prior to Visit   Medication Sig Dispense Refill    Aspirin Low Dose 81 MG chewable tablet CHEW ONE TABLET BY MOUTH ONCE DAILY      Farxiga 10 MG tablet Take 10 mg by mouth Daily. 90 tablet 1    losartan (COZAAR) 50 MG tablet Take 1 tablet by mouth Daily. 90 tablet 0    metoprolol tartrate (LOPRESSOR) 50 MG tablet Take 1 tablet by mouth 2 (Two) Times a Day. 180 tablet 1    atorvastatin (LIPITOR) 20 MG tablet Take 1 tablet by mouth Daily. (Patient not taking: Reported on 11/11/2024)      multivitamin with minerals (CENTRUM SILVER 50+MEN PO)  (Patient not taking: Reported on 11/11/2024)      ondansetron (ZOFRAN) 8 MG tablet Take 1 tablet by mouth Every 8 (Eight) Hours As Needed for Nausea or Vomiting. (Patient not taking: Reported on 11/11/2024)      [DISCONTINUED] naproxen (NAPROSYN) 500 MG tablet naproxen 500 mg tablet (Patient not taking: Reported on 11/11/2024)      [DISCONTINUED] Tirzepatide (Mounjaro) 2.5 MG/0.5ML solution pen-injector pen Inject 0.5 mL under the skin into the appropriate area as directed 1 (One) Time Per Week. (Patient not taking: Reported on 11/11/2024) 2 mL 0     No current facility-administered medications on file prior to visit.        Past Medical History:   Diagnosis Date    Allergic     " Anxiety     Arthritis     Cancer     Depression     Diabetes mellitus     GERD (gastroesophageal reflux disease)     HL (hearing loss)     Hypertension     Low back pain     Neuromuscular disorder     Obesity     Visual impairment       Past Surgical History:   Procedure Laterality Date    ADENOIDECTOMY      ANKLE SURGERY Left     placement of bar and screws    NEPHRECTOMY Left     TONSILLECTOMY      WRIST GANGLION EXCISION Left       Family History   Problem Relation Age of Onset    Migraine headaches Mother     Hypertension Father     Hyperlipidemia Father     Diabetes Father     Stroke Father     Dementia Father     Cancer Sister       Social History     Socioeconomic History    Marital status:    Tobacco Use    Smoking status: Former     Current packs/day: 1.00     Average packs/day: 1 pack/day for 40.9 years (40.9 ttl pk-yrs)     Types: Cigarettes     Start date: 1984    Smokeless tobacco: Never   Vaping Use    Vaping status: Never Used   Substance and Sexual Activity    Alcohol use: Not Currently    Drug use: Never    Sexual activity: Yes     Partners: Female         Clinical Support on 09/06/2024   Component Date Value Ref Range Status    Glucose 09/06/2024 124 (H)  65 - 99 mg/dL Final    BUN 09/06/2024 16  6 - 20 mg/dL Final    Creatinine 09/06/2024 1.06  0.76 - 1.27 mg/dL Final    Sodium 09/06/2024 137  136 - 145 mmol/L Final    Potassium 09/06/2024 4.6  3.5 - 5.2 mmol/L Final    Chloride 09/06/2024 102  98 - 107 mmol/L Final    CO2 09/06/2024 25.0  22.0 - 29.0 mmol/L Final    Calcium 09/06/2024 9.6  8.6 - 10.5 mg/dL Final    Total Protein 09/06/2024 6.8  6.0 - 8.5 g/dL Final    Albumin 09/06/2024 3.8  3.5 - 5.2 g/dL Final    ALT (SGPT) 09/06/2024 23  1 - 41 U/L Final    AST (SGOT) 09/06/2024 24  1 - 40 U/L Final    Alkaline Phosphatase 09/06/2024 118 (H)  39 - 117 U/L Final    Total Bilirubin 09/06/2024 0.4  0.0 - 1.2 mg/dL Final    Globulin 09/06/2024 3.0  gm/dL Final    A/G Ratio 09/06/2024 1.3   g/dL Final    BUN/Creatinine Ratio 09/06/2024 15.1  7.0 - 25.0 Final    Anion Gap 09/06/2024 10.0  5.0 - 15.0 mmol/L Final    eGFR 09/06/2024 82.9  >60.0 mL/min/1.73 Final    Total Cholesterol 09/06/2024 126  0 - 200 mg/dL Final    Triglycerides 09/06/2024 158 (H)  0 - 150 mg/dL Final    HDL Cholesterol 09/06/2024 45  40 - 60 mg/dL Final    LDL Cholesterol  09/06/2024 54  0 - 100 mg/dL Final    VLDL Cholesterol 09/06/2024 27  5 - 40 mg/dL Final    LDL/HDL Ratio 09/06/2024 1.10   Final    Hemoglobin A1C 09/06/2024 8.80 (H)  4.80 - 5.60 % Final    TSH 09/06/2024 3.210  0.270 - 4.200 uIU/mL Final    Microalbumin/Creatinine Ratio 09/06/2024 32.7 (H)  0.0 - 29.0 mg/g Final    Creatinine, Urine 09/06/2024 116.1  mg/dL Final    Microalbumin, Urine 09/06/2024 3.8  mg/dL Final    WBC 09/06/2024 8.10  3.40 - 10.80 10*3/mm3 Final    RBC 09/06/2024 4.97  4.14 - 5.80 10*6/mm3 Final    Hemoglobin 09/06/2024 14.2  13.0 - 17.7 g/dL Final    Hematocrit 09/06/2024 43.0  37.5 - 51.0 % Final    MCV 09/06/2024 86.5  79.0 - 97.0 fL Final    MCH 09/06/2024 28.6  26.6 - 33.0 pg Final    MCHC 09/06/2024 33.0  31.5 - 35.7 g/dL Final    RDW 09/06/2024 12.7  12.3 - 15.4 % Final    RDW-SD 09/06/2024 39.3  37.0 - 54.0 fl Final    MPV 09/06/2024 11.1  6.0 - 12.0 fL Final    Platelets 09/06/2024 230  140 - 450 10*3/mm3 Final    Neutrophil % 09/06/2024 62.1  42.7 - 76.0 % Final    Lymphocyte % 09/06/2024 24.7  19.6 - 45.3 % Final    Monocyte % 09/06/2024 8.3  5.0 - 12.0 % Final    Eosinophil % 09/06/2024 3.0  0.3 - 6.2 % Final    Basophil % 09/06/2024 1.0  0.0 - 1.5 % Final    Immature Grans % 09/06/2024 0.9 (H)  0.0 - 0.5 % Final    Neutrophils, Absolute 09/06/2024 5.04  1.70 - 7.00 10*3/mm3 Final    Lymphocytes, Absolute 09/06/2024 2.00  0.70 - 3.10 10*3/mm3 Final    Monocytes, Absolute 09/06/2024 0.67  0.10 - 0.90 10*3/mm3 Final    Eosinophils, Absolute 09/06/2024 0.24  0.00 - 0.40 10*3/mm3 Final    Basophils, Absolute 09/06/2024 0.08  0.00 -  0.20 10*3/mm3 Final    Immature Grans, Absolute 09/06/2024 0.07 (H)  0.00 - 0.05 10*3/mm3 Final    nRBC 09/06/2024 0.0  0.0 - 0.2 /100 WBC Final         Physical Exam  Vitals and nursing note reviewed.   Constitutional:       Appearance: Normal appearance. He is normal weight.   HENT:      Head: Normocephalic and atraumatic.   Cardiovascular:      Rate and Rhythm: Normal rate and regular rhythm.      Pulses: Normal pulses.      Heart sounds: Normal heart sounds. No murmur heard.     No friction rub. No gallop.   Pulmonary:      Effort: Pulmonary effort is normal. No respiratory distress.      Breath sounds: Normal breath sounds. No stridor. No wheezing, rhonchi or rales.   Chest:      Chest wall: No tenderness.   Abdominal:      General: Abdomen is flat. Bowel sounds are normal. There is no distension.      Palpations: Abdomen is soft. There is no mass.      Tenderness: There is no abdominal tenderness. There is no right CVA tenderness, left CVA tenderness, guarding or rebound.      Hernia: No hernia is present.   Skin:     General: Skin is warm and dry.      Capillary Refill: Capillary refill takes less than 2 seconds.      Coloration: Skin is not jaundiced or pale.   Neurological:      General: No focal deficit present.      Mental Status: He is alert and oriented to person, place, and time. Mental status is at baseline.      Motor: No weakness.      Coordination: Coordination normal.      Gait: Gait normal.   Psychiatric:         Mood and Affect: Mood normal.         Behavior: Behavior normal.         Thought Content: Thought content normal.         Judgment: Judgment normal.          Physical Exam         Result Review  Data Reviewed:{ Labs  Result Review  Imaging  Med Tab  Media :23}   I have reviewed this patient's chart.  I have reviewed previous labs, previous imaging, previous medications, and previous encounters with notes that were available in this patient's chart.    Results  Laboratory Studies  GFR  was 82.9.              Assessment and Plan {CC Problem List  Visit Diagnosis  ROS  Review (Popup)  [facility] Maintenance  Quality  BestPractice  Medications  SmartSets  SnapShot Encounters  Media :23}   Diagnoses and all orders for this visit:    1. Diabetes mellitus type 2 without retinopathy (Primary)  -     Tirzepatide 2.5 MG/0.5ML solution auto-injector; Inject 2.5 mg under the skin into the appropriate area as directed 1 (One) Time Per Week.  Dispense: 2 mL; Refill: 2  -     SITagliptin (Januvia) 100 MG tablet; Take 1 tablet by mouth Daily.  Dispense: 14 tablet; Refill: 0    2. Need for prophylactic vaccination and inoculation against influenza  -     Fluzone >6mos (7418-4640)    3. Need for vaccination with 20-polyvalent pneumococcal conjugate vaccine  -     Pneumococcal Conjugate Vaccine 20-Valent (PCV20)    4. Screening for malignant neoplasm of colon  -     Cologuard - Stool, Per Rectum; Future    5. Chronic midline low back pain, unspecified whether sciatica present  -     meloxicam (Mobic) 7.5 MG tablet; Take 1 tablet by mouth Daily As Needed for Moderate Pain.  Dispense: 30 tablet; Refill: 1  -     tiZANidine (ZANAFLEX) 4 MG tablet; Take 1 tablet by mouth Every 8 (Eight) Hours As Needed for Muscle Spasms.  Dispense: 90 tablet; Refill: 1    6. Bilateral chronic knee pain  -     meloxicam (Mobic) 7.5 MG tablet; Take 1 tablet by mouth Daily As Needed for Moderate Pain.  Dispense: 30 tablet; Refill: 1    7. Muscle spasm of back  -     tiZANidine (ZANAFLEX) 4 MG tablet; Take 1 tablet by mouth Every 8 (Eight) Hours As Needed for Muscle Spasms.  Dispense: 90 tablet; Refill: 1        Assessment & Plan  1. Diabetes Mellitus.  A prescription for Mounjaro will be resent to the pharmacy. If insurance does not cover it, other options will be considered. A secondary option, Januvia, will be sent over if Mounjaro is not covered.  A sample of Januvia has been given to patient today while he is waiting on trying  to get insurance to cover Mounjaro.    2. Back Pain.  His GFR is currently at 82.9. Meloxicam will be prescribed for use as needed. Tizanidine will also be prescribed for use up to three times daily as needed. It is recommended that he avoid driving or engaging in heavy activities for 8 hours after taking tizanidine. A 30-day supply with a refill will be provided. He is advised not to take ibuprofen on the same day as meloxicam.     -ER red flags discussed with patient including risk versus benefit and education provided.  -Follow-up with me in 3 months at the next routine visit.    I spent 30 minutes caring for Arvin on this date of service. This time includes time spent by me in the following activities:preparing for the visit, reviewing tests, obtaining and/or reviewing a separately obtained history, performing a medically appropriate examination and/or evaluation , counseling and educating the patient/family/caregiver, ordering medications, tests, or procedures, referring and communicating with other health care professionals , documenting information in the medical record, independently interpreting results and communicating that information with the patient/family/caregiver, and care coordination.    Follow Up {Instructions Charge Capture  Follow-up Communications :23}     Patient was given instructions and counseling regarding his condition or for health maintenance advice. Please see specific information pulled into the AVS (placed there by myself) if appropriate.    Return in about 3 months (around 2/11/2025) for routine follow up.    Class 3 Severe Obesity (BMI >=40). Obesity-related health conditions include the following: diabetes mellitus. Obesity is unchanged. BMI is is above average; BMI management plan is completed. We discussed portion control and increasing exercise.         LEIGH Sifuentes, [facility]      Patient or patient representative verbalized consent for the use of Ambient  Listening during the visit with  LEIGH Sifuentes for chart documentation. 11/11/2024  12:15 EST

## 2024-12-03 ENCOUNTER — PRIOR AUTHORIZATION (OUTPATIENT)
Dept: FAMILY MEDICINE CLINIC | Facility: CLINIC | Age: 54
End: 2024-12-03
Payer: COMMERCIAL

## 2024-12-16 DIAGNOSIS — E11.9 TYPE 2 DIABETES MELLITUS WITHOUT COMPLICATION, WITHOUT LONG-TERM CURRENT USE OF INSULIN: Primary | ICD-10-CM

## 2024-12-16 NOTE — TELEPHONE ENCOUNTER
Rx Refill Note  Requested Prescriptions     Pending Prescriptions Disp Refills    Tirzepatide 5 MG/0.5ML solution auto-injector 2 mL 1     Sig: Inject 0.5 mL under the skin into the appropriate area as directed 1 (One) Time Per Week.      Last office visit with prescribing clinician: 11/11/2024   Last telemedicine visit with prescribing clinician: Visit date not found   Next office visit with prescribing clinician: Visit date not found                         Would you like a call back once the refill request has been completed: [] Yes [] No    If the office needs to give you a call back, can they leave a voicemail: [] Yes [] No    Krystle Daley MA  12/16/24, 15:38 EST

## 2025-02-06 DIAGNOSIS — E11.9 TYPE 2 DIABETES MELLITUS WITHOUT COMPLICATION, WITHOUT LONG-TERM CURRENT USE OF INSULIN: ICD-10-CM

## 2025-02-06 NOTE — TELEPHONE ENCOUNTER
Caller: [email or web address]    Relationship: Emergency Contact    Best call back number: [phone]    Requested Prescriptions:   Requested Prescriptions     Pending Prescriptions Disp Refills    Tirzepatide 5 MG/0.5ML solution auto-injector 2 mL 1     Sig: Inject 0.5 mL under the skin into the appropriate area as directed 1 (One) Time Per Week.        Pharmacy where request should be sent: [place] [place] - [address] [facility] [phone] [facility] [phone]      Last office visit with prescribing clinician: 11/11/2024   Last telemedicine visit with prescribing clinician: Visit date not found   Next office visit with prescribing clinician: 3/12/2025     Additional details provided by patient: TOOK HIS LAST SHOT LAST WEEK     Does the patient have less than a 3 day supply:  [x] Yes  [] No    Would you like a call back once the refill request has been completed: [] Yes [x] No    If the office needs to give you a call back, can they leave a voicemail: [] Yes [x] No    Roe Nichole   02/06/25 15:32 EST

## 2025-03-12 ENCOUNTER — TELEMEDICINE (OUTPATIENT)
Dept: FAMILY MEDICINE CLINIC | Facility: CLINIC | Age: 55
End: 2025-03-12
Payer: COMMERCIAL

## 2025-03-12 DIAGNOSIS — Z13.29 SCREENING FOR THYROID DISORDER: ICD-10-CM

## 2025-03-12 DIAGNOSIS — I10 ESSENTIAL HYPERTENSION: ICD-10-CM

## 2025-03-12 DIAGNOSIS — E78.2 MIXED HYPERLIPIDEMIA: ICD-10-CM

## 2025-03-12 DIAGNOSIS — E11.9 DIABETES MELLITUS TYPE 2 WITHOUT RETINOPATHY: Primary | ICD-10-CM

## 2025-03-12 PROCEDURE — 82043 UR ALBUMIN QUANTITATIVE: CPT | Performed by: REGISTERED NURSE

## 2025-03-12 PROCEDURE — 83036 HEMOGLOBIN GLYCOSYLATED A1C: CPT | Performed by: REGISTERED NURSE

## 2025-03-12 PROCEDURE — 80061 LIPID PANEL: CPT | Performed by: REGISTERED NURSE

## 2025-03-12 PROCEDURE — 82570 ASSAY OF URINE CREATININE: CPT | Performed by: REGISTERED NURSE

## 2025-03-12 PROCEDURE — 80050 GENERAL HEALTH PANEL: CPT | Performed by: REGISTERED NURSE

## 2025-03-12 NOTE — PROGRESS NOTES
[facility] PRIMARY CARE  Patient: Arvin Patel   Age: 54 y.o.  Sex: male  :  1970  MRN: [id number]    Arvin Patel was located at home and I was located at my office for this telemedicine/telephone encounter. We utilized the telephone At[employer] video option for the encounter and Arvin Patel and I were able to hear and see each other simultaneously in real time. I introduced myself and verified Arvin Patel identity. I explained how the telemedicine visit will occur. I advised Arvin Patel that technology-related delays and breaches of privacy are potential risks associated with conducting the encounter via telemedicine.    I also advised Arvin Patel that at any point he may terminate the telemedicine encounter and withdraw his consent for receiving care via telemedicine without affecting his ability to receive future care from us, and that I may also terminate the telemedicine encounter if I determine that an in-person visit is more appropriate for the condition[s] for which treatment is sought.    Having covered these considerations, Arvin Patel verbally acknowledged them and gave consent for the use of telemedicine in his care.    Start time: 151  End time 151    CHIEF COMPLAINT:  Chief Complaint   Patient presents with    Diabetes        The following portions of the chart were reviewed this encounter and updated as appropriate:    History of Present Illness  In regards to type 2 diabetes mellitus, patient is currently taking Farxiga and Mounjaro to manage his type 2 diabetes.  Patient shares that he is tolerating both these medications well.  Patient does share that his sugar levels are still elevated but are improving after starting Mounjaro.  He is currently on 5 mg weekly and is requesting to increase to 7.5 mg weekly.  Patient is willing to come in for fasting labs today.  Orders will be placed so patient can get this done.  He denies any concerns or  issues with his Farxiga or Mounjaro at this time.  He denies any signs of gastroparesis or other stomach abnormalities at this time.  He is content with his current treatment for Farxiga and Mounjaro.    In regards to hypertension, patient is currently taking metoprolol and losartan to manage his hypertension.  When patient comes for fasting labs today he is agreeable to get his blood pressure checked.  He states that his blood pressure has been running around 130/80 at home.  He denies any concerns or issues with his hypertension or hypertension medication at this time.    In regards to hyperlipidemia, patient was previously taking atorvastatin to manage this.  He shares that he is currently managing this with diet alone.  After fasting labs if his cholesterol levels are still elevated we can discuss potentially starting this medication again or starting an alternative option.       There were no vitals taken for this visit.   Allergies   Allergen Reactions    Clindamycin Anaphylaxis    Penicillins Anaphylaxis     Past Medical History:   Diagnosis Date    Allergic     Anxiety     Arthritis     Cancer     Depression     Diabetes mellitus     GERD (gastroesophageal reflux disease)     HL (hearing loss)     Hypertension     Low back pain     Neuromuscular disorder     Obesity     Visual impairment        REVIEW OF SYSTEMS  Review of Systems   Constitutional: Negative.  Negative for activity change, chills, fatigue and fever.   HENT: Negative.  Negative for congestion, dental problem, ear pain, hearing loss, rhinorrhea, sinus pain, sore throat, tinnitus and trouble swallowing.    Eyes: Negative.  Negative for pain and visual disturbance.   Respiratory: Negative.  Negative for cough, chest tightness, shortness of breath and wheezing.    Cardiovascular: Negative.  Negative for chest pain, palpitations and leg swelling.   Gastrointestinal: Negative.  Negative for abdominal pain, diarrhea, nausea and vomiting.   Endocrine:  Negative.  Negative for polydipsia, polyphagia and polyuria.   Genitourinary: Negative.  Negative for difficulty urinating, dysuria, frequency and urgency.   Musculoskeletal: Negative.  Negative for arthralgias, back pain and myalgias.   Skin: Negative.  Negative for color change, pallor, rash and wound.   Allergic/Immunologic: Negative.  Negative for environmental allergies.   Neurological: Negative.  Negative for dizziness, speech difficulty, weakness, light-headedness, numbness and headaches.   Hematological: Negative.    Psychiatric/Behavioral: Negative.  Negative for confusion, decreased concentration, self-injury and suicidal ideas. The patient is not nervous/anxious.    All other systems reviewed and are negative.             LAB REVIEW  Telemedicine on 03/12/2025   Component Date Value Ref Range Status    Glucose 03/12/2025 87  65 - 99 mg/dL Final    BUN 03/12/2025 10  6 - 20 mg/dL Final    Creatinine 03/12/2025 1.00  0.76 - 1.27 mg/dL Final    Sodium 03/12/2025 140  136 - 145 mmol/L Final    Potassium 03/12/2025 4.9  3.5 - 5.2 mmol/L Final    Chloride 03/12/2025 105  98 - 107 mmol/L Final    CO2 03/12/2025 24.7  22.0 - 29.0 mmol/L Final    Calcium 03/12/2025 9.2  8.6 - 10.5 mg/dL Final    Total Protein 03/12/2025 6.9  6.0 - 8.5 g/dL Final    Albumin 03/12/2025 3.2 (L)  3.5 - 5.2 g/dL Final    ALT (SGPT) 03/12/2025 22  1 - 41 U/L Final    AST (SGOT) 03/12/2025 24  1 - 40 U/L Final    Alkaline Phosphatase 03/12/2025 107  39 - 117 U/L Final    Total Bilirubin 03/12/2025 0.4  0.0 - 1.2 mg/dL Final    Globulin 03/12/2025 3.7  gm/dL Final    A/G Ratio 03/12/2025 0.9  g/dL Final    BUN/Creatinine Ratio 03/12/2025 10.0  7.0 - 25.0 Final    Anion Gap 03/12/2025 10.3  5.0 - 15.0 mmol/L Final    eGFR 03/12/2025 88.3  >60.0 mL/min/1.73 Final    Total Cholesterol 03/12/2025 109  0 - 200 mg/dL Final    Triglycerides 03/12/2025 100  0 - 150 mg/dL Final    HDL Cholesterol 03/12/2025 46  40 - 60 mg/dL Final    LDL  Cholesterol  03/12/2025 44  0 - 100 mg/dL Final    VLDL Cholesterol 03/12/2025 19  5 - 40 mg/dL Final    LDL/HDL Ratio 03/12/2025 0.93   Final    Hemoglobin A1C 03/12/2025 6.80 (H)  4.80 - 5.60 % Final    TSH 03/12/2025 2.450  0.270 - 4.200 uIU/mL Final    Microalbumin/Creatinine Ratio 03/12/2025 30.1 (H)  0.0 - 29.0 mg/g Final    Creatinine, Urine 03/12/2025 49.8  mg/dL Final    Microalbumin, Urine 03/12/2025 1.5  mg/dL Final    WBC 03/12/2025 7.63  3.40 - 10.80 10*3/mm3 Final    RBC 03/12/2025 5.21  4.14 - 5.80 10*6/mm3 Final    Hemoglobin 03/12/2025 14.6  13.0 - 17.7 g/dL Final    Hematocrit 03/12/2025 45.5  37.5 - 51.0 % Final    MCV 03/12/2025 87.3  79.0 - 97.0 fL Final    MCH 03/12/2025 28.0  26.6 - 33.0 pg Final    MCHC 03/12/2025 32.1  31.5 - 35.7 g/dL Final    RDW 03/12/2025 13.0  12.3 - 15.4 % Final    RDW-SD 03/12/2025 41.1  37.0 - 54.0 fl Final    MPV 03/12/2025 10.6  6.0 - 12.0 fL Final    Platelets 03/12/2025 243  140 - 450 10*3/mm3 Final    Neutrophil % 03/12/2025 62.4  42.7 - 76.0 % Final    Lymphocyte % 03/12/2025 24.0  19.6 - 45.3 % Final    Monocyte % 03/12/2025 7.3  5.0 - 12.0 % Final    Eosinophil % 03/12/2025 4.5  0.3 - 6.2 % Final    Basophil % 03/12/2025 0.8  0.0 - 1.5 % Final    Immature Grans % 03/12/2025 1.0 (H)  0.0 - 0.5 % Final    Neutrophils, Absolute 03/12/2025 4.76  1.70 - 7.00 10*3/mm3 Final    Lymphocytes, Absolute 03/12/2025 1.83  0.70 - 3.10 10*3/mm3 Final    Monocytes, Absolute 03/12/2025 0.56  0.10 - 0.90 10*3/mm3 Final    Eosinophils, Absolute 03/12/2025 0.34  0.00 - 0.40 10*3/mm3 Final    Basophils, Absolute 03/12/2025 0.06  0.00 - 0.20 10*3/mm3 Final    Immature Grans, Absolute 03/12/2025 0.08 (H)  0.00 - 0.05 10*3/mm3 Final    nRBC 03/12/2025 0.0  0.0 - 0.2 /100 WBC Final           1. Diabetes mellitus type 2 without retinopathy    2. Essential hypertension    3. Screening for thyroid disorder    4. Mixed hyperlipidemia         Assessment & Plan    -Fasting labs  today.  -Increase Mounjaro to 7.5 mg weekly for better glycemic control.  -ER red flags discussed with patient.  -Follow-up in 3 months or sooner if needed.  Make sure you bring a log of blood pressure readings and glucose readings to his visit with me.        LEIGH Sifuentes, FNP-BC  3/31/2025 12:34 EDT      Patient or patient representative verbalized consent for the use of Ambient Listening during the visit with  LEIGH Sifuentes for chart documentation. 3/31/2025  12:35 EDT

## 2025-03-12 NOTE — PROGRESS NOTES
Venipuncture Blood Specimen Collection  Venipuncture performed in RT ARM by Javi Smith with good hemostasis. Patient tolerated the procedure well without complications.   03/12/25   Javi Smith

## 2025-03-13 LAB
ALBUMIN SERPL-MCNC: 3.2 G/DL (ref 3.5–5.2)
ALBUMIN UR-MCNC: 1.5 MG/DL
ALBUMIN/GLOB SERPL: 0.9 G/DL
ALP SERPL-CCNC: 107 U/L (ref 39–117)
ALT SERPL W P-5'-P-CCNC: 22 U/L (ref 1–41)
ANION GAP SERPL CALCULATED.3IONS-SCNC: 10.3 MMOL/L (ref 5–15)
AST SERPL-CCNC: 24 U/L (ref 1–40)
BASOPHILS # BLD AUTO: 0.06 10*3/MM3 (ref 0–0.2)
BASOPHILS NFR BLD AUTO: 0.8 % (ref 0–1.5)
BILIRUB SERPL-MCNC: 0.4 MG/DL (ref 0–1.2)
BUN SERPL-MCNC: 10 MG/DL (ref 6–20)
BUN/CREAT SERPL: 10 (ref 7–25)
CALCIUM SPEC-SCNC: 9.2 MG/DL (ref 8.6–10.5)
CHLORIDE SERPL-SCNC: 105 MMOL/L (ref 98–107)
CHOLEST SERPL-MCNC: 109 MG/DL (ref 0–200)
CO2 SERPL-SCNC: 24.7 MMOL/L (ref 22–29)
CREAT SERPL-MCNC: 1 MG/DL (ref 0.76–1.27)
CREAT UR-MCNC: 49.8 MG/DL
DEPRECATED RDW RBC AUTO: 41.1 FL (ref 37–54)
EGFRCR SERPLBLD CKD-EPI 2021: 88.3 ML/MIN/1.73
EOSINOPHIL # BLD AUTO: 0.34 10*3/MM3 (ref 0–0.4)
EOSINOPHIL NFR BLD AUTO: 4.5 % (ref 0.3–6.2)
ERYTHROCYTE [DISTWIDTH] IN BLOOD BY AUTOMATED COUNT: 13 % (ref 12.3–15.4)
GLOBULIN UR ELPH-MCNC: 3.7 GM/DL
GLUCOSE SERPL-MCNC: 87 MG/DL (ref 65–99)
HBA1C MFR BLD: 6.8 % (ref 4.8–5.6)
HCT VFR BLD AUTO: 45.5 % (ref 37.5–51)
HDLC SERPL-MCNC: 46 MG/DL (ref 40–60)
HGB BLD-MCNC: 14.6 G/DL (ref 13–17.7)
IMM GRANULOCYTES # BLD AUTO: 0.08 10*3/MM3 (ref 0–0.05)
IMM GRANULOCYTES NFR BLD AUTO: 1 % (ref 0–0.5)
LDLC SERPL CALC-MCNC: 44 MG/DL (ref 0–100)
LDLC/HDLC SERPL: 0.93 {RATIO}
LYMPHOCYTES # BLD AUTO: 1.83 10*3/MM3 (ref 0.7–3.1)
LYMPHOCYTES NFR BLD AUTO: 24 % (ref 19.6–45.3)
MCH RBC QN AUTO: 28 PG (ref 26.6–33)
MCHC RBC AUTO-ENTMCNC: 32.1 G/DL (ref 31.5–35.7)
MCV RBC AUTO: 87.3 FL (ref 79–97)
MICROALBUMIN/CREAT UR: 30.1 MG/G (ref 0–29)
MONOCYTES # BLD AUTO: 0.56 10*3/MM3 (ref 0.1–0.9)
MONOCYTES NFR BLD AUTO: 7.3 % (ref 5–12)
NEUTROPHILS NFR BLD AUTO: 4.76 10*3/MM3 (ref 1.7–7)
NEUTROPHILS NFR BLD AUTO: 62.4 % (ref 42.7–76)
NRBC BLD AUTO-RTO: 0 /100 WBC (ref 0–0.2)
PLATELET # BLD AUTO: 243 10*3/MM3 (ref 140–450)
PMV BLD AUTO: 10.6 FL (ref 6–12)
POTASSIUM SERPL-SCNC: 4.9 MMOL/L (ref 3.5–5.2)
PROT SERPL-MCNC: 6.9 G/DL (ref 6–8.5)
RBC # BLD AUTO: 5.21 10*6/MM3 (ref 4.14–5.8)
SODIUM SERPL-SCNC: 140 MMOL/L (ref 136–145)
TRIGL SERPL-MCNC: 100 MG/DL (ref 0–150)
TSH SERPL DL<=0.05 MIU/L-ACNC: 2.45 UIU/ML (ref 0.27–4.2)
VLDLC SERPL-MCNC: 19 MG/DL (ref 5–40)
WBC NRBC COR # BLD AUTO: 7.63 10*3/MM3 (ref 3.4–10.8)

## 2025-03-20 DIAGNOSIS — E11.9 TYPE 2 DIABETES MELLITUS WITHOUT COMPLICATION, WITHOUT LONG-TERM CURRENT USE OF INSULIN: ICD-10-CM

## 2025-03-20 RX ORDER — TIRZEPATIDE 5 MG/.5ML
0.5 INJECTION, SOLUTION SUBCUTANEOUS WEEKLY
Qty: 2 ML | Refills: 1 | OUTPATIENT
Start: 2025-03-20

## 2025-04-09 ENCOUNTER — OFFICE VISIT (OUTPATIENT)
Dept: FAMILY MEDICINE CLINIC | Facility: CLINIC | Age: 55
End: 2025-04-09
Payer: COMMERCIAL

## 2025-04-09 VITALS
TEMPERATURE: 98.9 F | HEART RATE: 95 BPM | SYSTOLIC BLOOD PRESSURE: 113 MMHG | BODY MASS INDEX: 42.66 KG/M2 | HEIGHT: 72 IN | DIASTOLIC BLOOD PRESSURE: 81 MMHG | RESPIRATION RATE: 18 BRPM | OXYGEN SATURATION: 96 % | WEIGHT: 315 LBS

## 2025-04-09 DIAGNOSIS — Z00.00 ANNUAL PHYSICAL EXAM: Primary | ICD-10-CM

## 2025-04-09 DIAGNOSIS — L98.9 SKIN LESION OF BACK: ICD-10-CM

## 2025-04-09 PROCEDURE — 99396 PREV VISIT EST AGE 40-64: CPT | Performed by: REGISTERED NURSE

## 2025-04-09 NOTE — PROGRESS NOTES
Chief Complaint  Annual Exam (Spot on back that he would like looked at. )    Subjective    History of Present Illness {CC  Problem List  Visit  Diagnosis   Encounters  Notes  Medications  Labs  Result Review Imaging  Media :23}     Arvin Patel presents to [facility] for Annual Exam (Spot on back that he would like looked at. ).      History of Present Illness  Patient is a 54 y.o. male who presents to the clinic today for annual physical exam with concerns of lesion on back x 2 years.  Patient denies any chest pain, shortness of breath, or any fevers.  Patient denies any known exposure to COVID, flu, or any other contagious illnesses.       History of Present Illness  In regards to skin lesion, he reports the presence of a lesion on his back, which initially was small but has progressively enlarged over the past 2 years. The lesion exhibits a pattern of rupturing, followed by a period of closure, and then subsequent discharge. This cycle has resulted in significant bloodstaining on his clothing. He is uncertain about the necessity of antibiotic treatment for this condition.    He has discontinued all his medications except Mounjaro. He reports that his blood pressure and cholesterol are well managed, so he does not feel it is necessary to continue those medications. His only current daily medication is Mounjaro.    MEDICATIONS  Current: Mounjaro       Review of Systems   Constitutional: Negative.  Negative for activity change, chills, fatigue and fever.   HENT: Negative.  Negative for congestion, dental problem, ear pain, hearing loss, rhinorrhea, sinus pain, sore throat, tinnitus and trouble swallowing.    Eyes: Negative.  Negative for pain and visual disturbance.   Respiratory: Negative.  Negative for cough, chest tightness, shortness of breath and wheezing.    Cardiovascular: Negative.  Negative for chest pain, palpitations and leg swelling.   Gastrointestinal:  "Negative.  Negative for abdominal pain, diarrhea, nausea and vomiting.   Endocrine: Negative.  Negative for polydipsia, polyphagia and polyuria.   Genitourinary: Negative.  Negative for difficulty urinating, dysuria, frequency and urgency.   Musculoskeletal: Negative.  Negative for arthralgias, back pain and myalgias.   Skin:  Positive for color change (Skin lesion of the back). Negative for pallor, rash and wound.   Allergic/Immunologic: Negative.  Negative for environmental allergies.   Neurological: Negative.  Negative for dizziness, speech difficulty, weakness, light-headedness, numbness and headaches.   Hematological: Negative.    Psychiatric/Behavioral: Negative.  Negative for confusion, decreased concentration, self-injury and suicidal ideas. The patient is not nervous/anxious.    All other systems reviewed and are negative.       Objective     Vital Signs:   /81 (BP Location: Left arm, Patient Position: Sitting, Cuff Size: Adult)   Pulse 95   Temp 98.9 °F (37.2 °C) (Temporal)   Resp 18   Ht 182.9 cm (72\")   Wt (!) 145 kg (320 lb)   SpO2 96%   BMI 43.40 kg/m²   Current Outpatient Medications on File Prior to Visit   Medication Sig Dispense Refill    Aspirin Low Dose 81 MG chewable tablet CHEW ONE TABLET BY MOUTH ONCE DAILY      meloxicam (Mobic) 7.5 MG tablet Take 1 tablet by mouth Daily As Needed for Moderate Pain. 30 tablet 1    Tirzepatide 7.5 MG/0.5ML solution auto-injector Inject 7.5 mg under the skin into the appropriate area as directed 1 (One) Time Per Week. 2 mL 2    tiZANidine (ZANAFLEX) 4 MG tablet Take 1 tablet by mouth Every 8 (Eight) Hours As Needed for Muscle Spasms. 90 tablet 1     No current facility-administered medications on file prior to visit.        Past Medical History:   Diagnosis Date    Allergic 1975    Penicillin    Anxiety     Arthritis     Cancer 3-2020    Kidney    Depression     Diabetes mellitus 2020    GERD (gastroesophageal reflux disease)     HL (hearing loss) " 2021    Hypertension     Low back pain 2022    Neuromuscular disorder     Obesity     Visual impairment 1976      Past Surgical History:   Procedure Laterality Date    ADENOIDECTOMY      ANKLE SURGERY Left     placement of bar and screws    NEPHRECTOMY Left     TONSILLECTOMY  1975    WRIST GANGLION EXCISION Left       Family History   Problem Relation Age of Onset    Migraine headaches Mother     Hypertension Father     Hyperlipidemia Father     Diabetes Father     Stroke Father     Dementia Father     COPD Father     Cancer Sister         Cervical    Early death Sister     Cancer Maternal Grandmother         Breast    Cancer Paternal Grandfather       Social History     Socioeconomic History    Marital status:    Tobacco Use    Smoking status: Former     Current packs/day: 1.00     Average packs/day: 1 pack/day for 41.3 years (41.3 ttl pk-yrs)     Types: Cigarettes     Start date: 1984    Smokeless tobacco: Never    Tobacco comments:     Quit in 2017   Vaping Use    Vaping status: Never Used   Substance and Sexual Activity    Alcohol use: Not Currently    Drug use: Never    Sexual activity: Yes     Partners: Female         Telemedicine on 03/12/2025   Component Date Value Ref Range Status    Glucose 03/12/2025 87  65 - 99 mg/dL Final    BUN 03/12/2025 10  6 - 20 mg/dL Final    Creatinine 03/12/2025 1.00  0.76 - 1.27 mg/dL Final    Sodium 03/12/2025 140  136 - 145 mmol/L Final    Potassium 03/12/2025 4.9  3.5 - 5.2 mmol/L Final    Chloride 03/12/2025 105  98 - 107 mmol/L Final    CO2 03/12/2025 24.7  22.0 - 29.0 mmol/L Final    Calcium 03/12/2025 9.2  8.6 - 10.5 mg/dL Final    Total Protein 03/12/2025 6.9  6.0 - 8.5 g/dL Final    Albumin 03/12/2025 3.2 (L)  3.5 - 5.2 g/dL Final    ALT (SGPT) 03/12/2025 22  1 - 41 U/L Final    AST (SGOT) 03/12/2025 24  1 - 40 U/L Final    Alkaline Phosphatase 03/12/2025 107  39 - 117 U/L Final    Total Bilirubin 03/12/2025 0.4  0.0 - 1.2 mg/dL Final    Globulin 03/12/2025 3.7   gm/dL Final    A/G Ratio 03/12/2025 0.9  g/dL Final    BUN/Creatinine Ratio 03/12/2025 10.0  7.0 - 25.0 Final    Anion Gap 03/12/2025 10.3  5.0 - 15.0 mmol/L Final    eGFR 03/12/2025 88.3  >60.0 mL/min/1.73 Final    Total Cholesterol 03/12/2025 109  0 - 200 mg/dL Final    Triglycerides 03/12/2025 100  0 - 150 mg/dL Final    HDL Cholesterol 03/12/2025 46  40 - 60 mg/dL Final    LDL Cholesterol  03/12/2025 44  0 - 100 mg/dL Final    VLDL Cholesterol 03/12/2025 19  5 - 40 mg/dL Final    LDL/HDL Ratio 03/12/2025 0.93   Final    Hemoglobin A1C 03/12/2025 6.80 (H)  4.80 - 5.60 % Final    TSH 03/12/2025 2.450  0.270 - 4.200 uIU/mL Final    Microalbumin/Creatinine Ratio 03/12/2025 30.1 (H)  0.0 - 29.0 mg/g Final    Creatinine, Urine 03/12/2025 49.8  mg/dL Final    Microalbumin, Urine 03/12/2025 1.5  mg/dL Final    WBC 03/12/2025 7.63  3.40 - 10.80 10*3/mm3 Final    RBC 03/12/2025 5.21  4.14 - 5.80 10*6/mm3 Final    Hemoglobin 03/12/2025 14.6  13.0 - 17.7 g/dL Final    Hematocrit 03/12/2025 45.5  37.5 - 51.0 % Final    MCV 03/12/2025 87.3  79.0 - 97.0 fL Final    MCH 03/12/2025 28.0  26.6 - 33.0 pg Final    MCHC 03/12/2025 32.1  31.5 - 35.7 g/dL Final    RDW 03/12/2025 13.0  12.3 - 15.4 % Final    RDW-SD 03/12/2025 41.1  37.0 - 54.0 fl Final    MPV 03/12/2025 10.6  6.0 - 12.0 fL Final    Platelets 03/12/2025 243  140 - 450 10*3/mm3 Final    Neutrophil % 03/12/2025 62.4  42.7 - 76.0 % Final    Lymphocyte % 03/12/2025 24.0  19.6 - 45.3 % Final    Monocyte % 03/12/2025 7.3  5.0 - 12.0 % Final    Eosinophil % 03/12/2025 4.5  0.3 - 6.2 % Final    Basophil % 03/12/2025 0.8  0.0 - 1.5 % Final    Immature Grans % 03/12/2025 1.0 (H)  0.0 - 0.5 % Final    Neutrophils, Absolute 03/12/2025 4.76  1.70 - 7.00 10*3/mm3 Final    Lymphocytes, Absolute 03/12/2025 1.83  0.70 - 3.10 10*3/mm3 Final    Monocytes, Absolute 03/12/2025 0.56  0.10 - 0.90 10*3/mm3 Final    Eosinophils, Absolute 03/12/2025 0.34  0.00 - 0.40 10*3/mm3 Final     Basophils, Absolute 03/12/2025 0.06  0.00 - 0.20 10*3/mm3 Final    Immature Grans, Absolute 03/12/2025 0.08 (H)  0.00 - 0.05 10*3/mm3 Final    nRBC 03/12/2025 0.0  0.0 - 0.2 /100 WBC Final         Physical Exam  Vitals and nursing note reviewed.   Constitutional:       Appearance: Normal appearance. He is normal weight.   HENT:      Head: Normocephalic and atraumatic.   Cardiovascular:      Rate and Rhythm: Normal rate and regular rhythm.      Pulses: Normal pulses.      Heart sounds: Normal heart sounds. No murmur heard.     No friction rub. No gallop.   Pulmonary:      Effort: Pulmonary effort is normal. No respiratory distress.      Breath sounds: Normal breath sounds. No stridor. No wheezing, rhonchi or rales.   Chest:      Chest wall: No tenderness.   Abdominal:      General: Abdomen is flat. Bowel sounds are normal. There is no distension.      Palpations: Abdomen is soft. There is no mass.      Tenderness: There is no abdominal tenderness. There is no right CVA tenderness, left CVA tenderness, guarding or rebound.      Hernia: No hernia is present.   Skin:     General: Skin is warm and dry.      Capillary Refill: Capillary refill takes less than 2 seconds.      Coloration: Skin is not jaundiced or pale.      Findings: Lesion present.          Neurological:      General: No focal deficit present.      Mental Status: He is alert and oriented to person, place, and time. Mental status is at baseline.      Motor: No weakness.      Coordination: Coordination normal.      Gait: Gait normal.   Psychiatric:         Mood and Affect: Mood normal.         Behavior: Behavior normal.         Thought Content: Thought content normal.         Judgment: Judgment normal.              Physical Exam  A lesion on the back was examined.       Result Review  Data Reviewed:{ Labs  Result Review  Imaging  Med Tab  Media :23}   I have reviewed this patient's chart.  I have reviewed previous labs, previous imaging, previous  medications, and previous encounters with notes that were available in this patient's chart.    Results  Laboratory Studies  A1c is down from 8.9 to 6.8.              Assessment and Plan {CC Problem List  Visit Diagnosis  ROS  Review (Popup)  [facility]  BestPractice  Medications  SmartSets  SnapShot Encounters  Media :23}   Diagnoses and all orders for this visit:    1. Annual physical exam (Primary)    2. Skin lesion of back  -     Ambulatory Referral to Dermatology        Assessment & Plan  1. Lesion on the back.  The lesion has been present for approximately 2 years and has grown significantly. It occasionally bursts and releases fluid, causing blood stains on clothing. A referral to a dermatologist will be initiated for further evaluation and management. The dermatologist should contact him within 2-3 business days. If no contact is made, he should inform the office.    2. Medication management.  He has stopped all his medications except Mounjaro. He reports that his blood pressure and cholesterol are well managed, so he does not feel it is necessary to continue those medications. His only current daily medication is Mounjaro.         -ER red flags discussed with patient including risk versus benefit and education provided.  -Follow-up with me in 2 months at our 3-month follow-up or sooner if needed.    I spent 30 minutes caring for Arvin on this date of service. This time includes time spent by me in the following activities:preparing for the visit, reviewing tests, obtaining and/or reviewing a separately obtained history, performing a medically appropriate examination and/or evaluation , counseling and educating the patient/family/caregiver, ordering medications, tests, or procedures, referring and communicating with other health care professionals , documenting information in the medical record, independently interpreting results and communicating that information with the  patient/family/caregiver, and care coordination.    Follow Up {Instructions Charge Capture  Follow-up Communications :23}     Patient was given instructions and counseling regarding his condition or for health maintenance advice. Please see specific information pulled into the AVS (placed there by myself) if appropriate.    No follow-ups on file.    Class 3 Severe Obesity (BMI >=40). Obesity-related health conditions include the following: hypertension, diabetes mellitus, and dyslipidemias. Obesity is unchanged. BMI is is above average; BMI management plan is completed. We discussed portion control and increasing exercise.         LEIGH Sifuentes, [facility]      Patient or patient representative verbalized consent for the use of Ambient Listening during the visit with  LEIGH Sifuentes for chart documentation. 4/25/2025  15:17 EDT

## 2025-06-25 DIAGNOSIS — E11.9 DIABETES MELLITUS TYPE 2 WITHOUT RETINOPATHY: ICD-10-CM

## 2025-07-14 ENCOUNTER — OFFICE VISIT (OUTPATIENT)
Dept: FAMILY MEDICINE CLINIC | Facility: CLINIC | Age: 55
End: 2025-07-14
Payer: COMMERCIAL

## 2025-07-14 VITALS
BODY MASS INDEX: 41.45 KG/M2 | HEART RATE: 96 BPM | OXYGEN SATURATION: 99 % | WEIGHT: 306 LBS | SYSTOLIC BLOOD PRESSURE: 108 MMHG | HEIGHT: 72 IN | DIASTOLIC BLOOD PRESSURE: 77 MMHG | TEMPERATURE: 97.8 F | RESPIRATION RATE: 18 BRPM

## 2025-07-14 DIAGNOSIS — G89.29 CHRONIC MIDLINE LOW BACK PAIN, UNSPECIFIED WHETHER SCIATICA PRESENT: Primary | ICD-10-CM

## 2025-07-14 DIAGNOSIS — M54.50 CHRONIC MIDLINE LOW BACK PAIN, UNSPECIFIED WHETHER SCIATICA PRESENT: Primary | ICD-10-CM

## 2025-07-14 DIAGNOSIS — Z11.59 NEED FOR HEPATITIS C SCREENING TEST: ICD-10-CM

## 2025-07-14 DIAGNOSIS — N52.9 ERECTILE DYSFUNCTION, UNSPECIFIED ERECTILE DYSFUNCTION TYPE: ICD-10-CM

## 2025-07-14 DIAGNOSIS — E11.9 DIABETES MELLITUS TYPE 2 WITHOUT RETINOPATHY: ICD-10-CM

## 2025-07-14 LAB
EXPIRATION DATE: ABNORMAL
HBA1C MFR BLD: 6.1 % (ref 4.5–5.7)
Lab: ABNORMAL

## 2025-07-14 PROCEDURE — 83036 HEMOGLOBIN GLYCOSYLATED A1C: CPT | Performed by: REGISTERED NURSE

## 2025-07-14 PROCEDURE — 99215 OFFICE O/P EST HI 40 MIN: CPT | Performed by: REGISTERED NURSE

## 2025-07-14 RX ORDER — DULOXETIN HYDROCHLORIDE 60 MG/1
1 CAPSULE, DELAYED RELEASE ORAL DAILY
COMMUNITY
Start: 2025-07-07 | End: 2025-07-14 | Stop reason: SDUPTHER

## 2025-07-14 RX ORDER — SILDENAFIL 100 MG/1
100 TABLET, FILM COATED ORAL DAILY PRN
Qty: 30 TABLET | Refills: 1 | Status: SHIPPED | OUTPATIENT
Start: 2025-07-14 | End: 2025-08-01

## 2025-07-14 RX ORDER — DULOXETIN HYDROCHLORIDE 60 MG/1
60 CAPSULE, DELAYED RELEASE ORAL DAILY
Qty: 90 CAPSULE | Refills: 0 | Status: SHIPPED | OUTPATIENT
Start: 2025-07-14

## 2025-08-01 DIAGNOSIS — N52.9 ERECTILE DYSFUNCTION, UNSPECIFIED ERECTILE DYSFUNCTION TYPE: ICD-10-CM

## 2025-08-01 RX ORDER — SILDENAFIL 100 MG/1
100 TABLET, FILM COATED ORAL DAILY PRN
Qty: 30 TABLET | Refills: 1 | Status: SHIPPED | OUTPATIENT
Start: 2025-08-01

## 2025-08-15 ENCOUNTER — PATIENT MESSAGE (OUTPATIENT)
Dept: FAMILY MEDICINE CLINIC | Facility: CLINIC | Age: 55
End: 2025-08-15
Payer: COMMERCIAL

## 2025-08-18 DIAGNOSIS — E11.9 DIABETES MELLITUS TYPE 2 WITHOUT RETINOPATHY: ICD-10-CM

## 2025-08-18 RX ORDER — TIRZEPATIDE 7.5 MG/.5ML
INJECTION, SOLUTION SUBCUTANEOUS
Qty: 2 ML | Refills: 0 | Status: SHIPPED | OUTPATIENT
Start: 2025-08-18